# Patient Record
Sex: FEMALE | Race: WHITE | Employment: UNEMPLOYED | URBAN - METROPOLITAN AREA
[De-identification: names, ages, dates, MRNs, and addresses within clinical notes are randomized per-mention and may not be internally consistent; named-entity substitution may affect disease eponyms.]

---
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
## 2020-10-18 ENCOUNTER — HOSPITAL ENCOUNTER (EMERGENCY)
Age: 20
Discharge: HOME | End: 2020-10-18
Attending: EMERGENCY MEDICINE
Payer: MEDICAID

## 2020-10-18 ENCOUNTER — APPOINTMENT (OUTPATIENT)
Dept: CT IMAGING | Age: 20
End: 2020-10-18
Payer: MEDICAID

## 2020-10-18 ENCOUNTER — APPOINTMENT (OUTPATIENT)
Dept: ULTRASOUND IMAGING | Age: 20
End: 2020-10-18
Payer: MEDICAID

## 2020-10-18 VITALS
OXYGEN SATURATION: 100 % | DIASTOLIC BLOOD PRESSURE: 73 MMHG | HEIGHT: 62 IN | BODY MASS INDEX: 51.53 KG/M2 | RESPIRATION RATE: 16 BRPM | SYSTOLIC BLOOD PRESSURE: 111 MMHG | HEART RATE: 91 BPM | WEIGHT: 280 LBS | TEMPERATURE: 98.6 F

## 2020-10-18 LAB
A/G RATIO: 1.3 (ref 1.1–2.2)
ALBUMIN SERPL-MCNC: 4.3 G/DL (ref 3.4–5)
ALP BLD-CCNC: 65 U/L (ref 40–129)
ALT SERPL-CCNC: 12 U/L (ref 10–40)
ANION GAP SERPL CALCULATED.3IONS-SCNC: 10 MMOL/L (ref 3–16)
AST SERPL-CCNC: 11 U/L (ref 15–37)
BACTERIA: ABNORMAL /HPF
BASOPHILS ABSOLUTE: 0.1 K/UL (ref 0–0.2)
BASOPHILS RELATIVE PERCENT: 0.8 %
BILIRUB SERPL-MCNC: 0.4 MG/DL (ref 0–1)
BILIRUBIN URINE: NEGATIVE
BLOOD, URINE: ABNORMAL
BUN BLDV-MCNC: 13 MG/DL (ref 7–20)
CALCIUM SERPL-MCNC: 9.3 MG/DL (ref 8.3–10.6)
CHLORIDE BLD-SCNC: 99 MMOL/L (ref 99–110)
CLARITY: ABNORMAL
CO2: 26 MMOL/L (ref 21–32)
COLOR: YELLOW
CREAT SERPL-MCNC: <0.5 MG/DL (ref 0.6–1.1)
EOSINOPHILS ABSOLUTE: 0.2 K/UL (ref 0–0.6)
EOSINOPHILS RELATIVE PERCENT: 1.4 %
EPITHELIAL CELLS, UA: ABNORMAL /HPF (ref 0–5)
GFR AFRICAN AMERICAN: >60
GFR NON-AFRICAN AMERICAN: >60
GLOBULIN: 3.2 G/DL
GLUCOSE BLD-MCNC: 93 MG/DL (ref 70–99)
GLUCOSE URINE: NEGATIVE MG/DL
HCG QUALITATIVE: NEGATIVE
HCT VFR BLD CALC: 40.6 % (ref 36–48)
HEMOGLOBIN: 13.5 G/DL (ref 12–16)
KETONES, URINE: NEGATIVE MG/DL
LEUKOCYTE ESTERASE, URINE: ABNORMAL
LIPASE: 27 U/L (ref 13–60)
LYMPHOCYTES ABSOLUTE: 2.5 K/UL (ref 1–5.1)
LYMPHOCYTES RELATIVE PERCENT: 17 %
MCH RBC QN AUTO: 25.8 PG (ref 26–34)
MCHC RBC AUTO-ENTMCNC: 33.2 G/DL (ref 31–36)
MCV RBC AUTO: 77.5 FL (ref 80–100)
MICROSCOPIC EXAMINATION: YES
MONOCYTES ABSOLUTE: 0.6 K/UL (ref 0–1.3)
MONOCYTES RELATIVE PERCENT: 4.1 %
MUCUS: ABNORMAL /LPF
NEUTROPHILS ABSOLUTE: 11.5 K/UL (ref 1.7–7.7)
NEUTROPHILS RELATIVE PERCENT: 76.7 %
NITRITE, URINE: NEGATIVE
PDW BLD-RTO: 14.6 % (ref 12.4–15.4)
PH UA: 6.5 (ref 5–8)
PLATELET # BLD: 414 K/UL (ref 135–450)
PMV BLD AUTO: 7.6 FL (ref 5–10.5)
POTASSIUM REFLEX MAGNESIUM: 4.2 MMOL/L (ref 3.5–5.1)
PROTEIN UA: ABNORMAL MG/DL
RBC # BLD: 5.24 M/UL (ref 4–5.2)
RBC UA: ABNORMAL /HPF (ref 0–4)
S PYO AG THROAT QL: NEGATIVE
SODIUM BLD-SCNC: 135 MMOL/L (ref 136–145)
SPECIFIC GRAVITY UA: 1.02 (ref 1–1.03)
TOTAL PROTEIN: 7.5 G/DL (ref 6.4–8.2)
URINE REFLEX TO CULTURE: YES
URINE TYPE: ABNORMAL
UROBILINOGEN, URINE: 0.2 E.U./DL
WBC # BLD: 15 K/UL (ref 4–11)
WBC UA: ABNORMAL /HPF (ref 0–5)

## 2020-10-18 PROCEDURE — 74177 CT ABD & PELVIS W/CONTRAST: CPT

## 2020-10-18 PROCEDURE — 96374 THER/PROPH/DIAG INJ IV PUSH: CPT

## 2020-10-18 PROCEDURE — 87591 N.GONORRHOEAE DNA AMP PROB: CPT

## 2020-10-18 PROCEDURE — 87086 URINE CULTURE/COLONY COUNT: CPT

## 2020-10-18 PROCEDURE — 6360000004 HC RX CONTRAST MEDICATION: Performed by: PHYSICIAN ASSISTANT

## 2020-10-18 PROCEDURE — 93975 VASCULAR STUDY: CPT

## 2020-10-18 PROCEDURE — 6360000002 HC RX W HCPCS: Performed by: PHYSICIAN ASSISTANT

## 2020-10-18 PROCEDURE — 99284 EMERGENCY DEPT VISIT MOD MDM: CPT

## 2020-10-18 PROCEDURE — 6370000000 HC RX 637 (ALT 250 FOR IP): Performed by: EMERGENCY MEDICINE

## 2020-10-18 PROCEDURE — 87081 CULTURE SCREEN ONLY: CPT

## 2020-10-18 PROCEDURE — 87880 STREP A ASSAY W/OPTIC: CPT

## 2020-10-18 PROCEDURE — 81001 URINALYSIS AUTO W/SCOPE: CPT

## 2020-10-18 PROCEDURE — 2580000003 HC RX 258: Performed by: PHYSICIAN ASSISTANT

## 2020-10-18 PROCEDURE — 85025 COMPLETE CBC W/AUTO DIFF WBC: CPT

## 2020-10-18 PROCEDURE — 76830 TRANSVAGINAL US NON-OB: CPT

## 2020-10-18 PROCEDURE — 80053 COMPREHEN METABOLIC PANEL: CPT

## 2020-10-18 PROCEDURE — 84703 CHORIONIC GONADOTROPIN ASSAY: CPT

## 2020-10-18 PROCEDURE — 83690 ASSAY OF LIPASE: CPT

## 2020-10-18 PROCEDURE — 6370000000 HC RX 637 (ALT 250 FOR IP): Performed by: PHYSICIAN ASSISTANT

## 2020-10-18 PROCEDURE — 87491 CHLMYD TRACH DNA AMP PROBE: CPT

## 2020-10-18 RX ORDER — CEFDINIR 300 MG/1
300 CAPSULE ORAL EVERY 12 HOURS SCHEDULED
Status: DISCONTINUED | OUTPATIENT
Start: 2020-10-18 | End: 2020-10-18

## 2020-10-18 RX ORDER — CEFDINIR 300 MG/1
300 CAPSULE ORAL ONCE
Status: COMPLETED | OUTPATIENT
Start: 2020-10-18 | End: 2020-10-18

## 2020-10-18 RX ORDER — KETOROLAC TROMETHAMINE 30 MG/ML
15 INJECTION, SOLUTION INTRAMUSCULAR; INTRAVENOUS ONCE
Status: COMPLETED | OUTPATIENT
Start: 2020-10-18 | End: 2020-10-18

## 2020-10-18 RX ORDER — PHENAZOPYRIDINE HYDROCHLORIDE 100 MG/1
200 TABLET, FILM COATED ORAL ONCE
Status: COMPLETED | OUTPATIENT
Start: 2020-10-18 | End: 2020-10-18

## 2020-10-18 RX ORDER — 0.9 % SODIUM CHLORIDE 0.9 %
1000 INTRAVENOUS SOLUTION INTRAVENOUS ONCE
Status: COMPLETED | OUTPATIENT
Start: 2020-10-18 | End: 2020-10-18

## 2020-10-18 RX ORDER — IBUPROFEN 600 MG/1
600 TABLET ORAL EVERY 6 HOURS PRN
Qty: 20 TABLET | Refills: 0 | Status: SHIPPED | OUTPATIENT
Start: 2020-10-18 | End: 2021-01-22

## 2020-10-18 RX ORDER — CEFDINIR 300 MG/1
300 CAPSULE ORAL 2 TIMES DAILY
Qty: 20 CAPSULE | Refills: 0 | Status: SHIPPED | OUTPATIENT
Start: 2020-10-18 | End: 2020-10-28

## 2020-10-18 RX ORDER — PHENAZOPYRIDINE HYDROCHLORIDE 100 MG/1
100 TABLET, FILM COATED ORAL 3 TIMES DAILY PRN
Qty: 6 TABLET | Refills: 0 | Status: SHIPPED | OUTPATIENT
Start: 2020-10-18 | End: 2020-10-20

## 2020-10-18 RX ADMIN — SODIUM CHLORIDE 1000 ML: 9 INJECTION, SOLUTION INTRAVENOUS at 15:30

## 2020-10-18 RX ADMIN — IOPAMIDOL 75 ML: 755 INJECTION, SOLUTION INTRAVENOUS at 15:40

## 2020-10-18 RX ADMIN — PHENAZOPYRIDINE 200 MG: 100 TABLET ORAL at 19:03

## 2020-10-18 RX ADMIN — KETOROLAC TROMETHAMINE 15 MG: 30 INJECTION, SOLUTION INTRAMUSCULAR; INTRAVENOUS at 15:31

## 2020-10-18 RX ADMIN — CEFDINIR 300 MG: 300 CAPSULE ORAL at 19:03

## 2020-10-18 ASSESSMENT — PAIN DESCRIPTION - LOCATION
LOCATION: ABDOMEN
LOCATION: ABDOMEN

## 2020-10-18 ASSESSMENT — ENCOUNTER SYMPTOMS
CONSTIPATION: 0
ABDOMINAL PAIN: 1
COUGH: 0
SORE THROAT: 1
NAUSEA: 0
SHORTNESS OF BREATH: 0
VOMITING: 0
DIARRHEA: 0

## 2020-10-18 ASSESSMENT — PAIN SCALES - GENERAL
PAINLEVEL_OUTOF10: 10
PAINLEVEL_OUTOF10: 9
PAINLEVEL_OUTOF10: 8

## 2020-10-18 ASSESSMENT — PAIN DESCRIPTION - PAIN TYPE: TYPE: ACUTE PAIN

## 2020-10-18 ASSESSMENT — PAIN DESCRIPTION - ORIENTATION: ORIENTATION: LOWER

## 2020-10-18 NOTE — ED NOTES
Pt refusing pelvic exam at this time. Ondina OQUENDO and Dr. Meena Shaffer made aware at this time.       Gerard Turk RN  10/18/20 7900

## 2020-10-18 NOTE — ED PROVIDER NOTES
[address] ED  EMERGENCY DEPARTMENT ENCOUNTER        Pt Name: Farrel Saint  MRN: [id number]  [place] 2000  Date of evaluation: 10/18/2020  Provider: Malik Granados PA-C  PCP: No primary care provider on file. Shared Visit or Autonomous Visit:  I have seen and evaluated this patient with my supervising physician Leonora, [address] [place] [address]       Chief Complaint   Patient presents with    Abdominal Pain     Pt c/o low abd pain that began overnt. States she thought at first it was menstrual cramps but have continued to intensify. Negative hcg at home. Denies N/V/D.       HISTORY OF PRESENT ILLNESS   (Location/Symptom, Timing/Onset, Context/Setting, Quality, Duration, Modifying Factors, Severity)  Note limiting factors. Farrel Saint is a 23 y.o. female presented to the emergency department complaint of lower abdominal pain that started last night when she laid down for bed. Pain has been constant and worsening. Worse with position change and movement. States if she lays on one side will cause more pain and then she has to switch positions and then causes pain on the other side pain is across lower abdomen. Denies any nausea no vomiting no diarrhea no constipation. No fevers. No urinary symptoms. Pain feels like period cramps but states she stopped her period 3 days ago. Denies any current vaginal bleeding and no discharge. No prior abdominal surgeries. Patient stating has also had a sore throat and has noticed redness at her uvula in the past 3 days states yesterday he had white spots on it but it is gone today. Hx uvulitis. No difficulty swallowing. No known ill exposures. No cough or fever. The history is provided by the patient.    Abdominal Pain   Pain location:  LLQ, RLQ and suprapubic  Pain quality: cramping    Pain radiates to:  Does not radiate  Onset quality:  Gradual  Duration:  2 days  Timing:  Constant  Progression: Worsening  Chronicity:  New  Worsened by: Movement and position changes  Associated symptoms: sore throat    Associated symptoms: no chest pain, no chills, no constipation, no cough, no diarrhea, no dysuria, no fever, no hematuria, no nausea, no shortness of breath, no vaginal bleeding, no vaginal discharge and no vomiting          Nursing Notes were reviewed    REVIEW OF SYSTEMS    (2-9 systems for level 4, 10 or more for level 5)     Review of Systems   Constitutional: Negative for chills and fever. HENT: Positive for sore throat. Respiratory: Negative for cough and shortness of breath. Cardiovascular: Negative for chest pain. Gastrointestinal: Positive for abdominal pain. Negative for constipation, diarrhea, nausea and vomiting. Genitourinary: Negative for difficulty urinating, dysuria, hematuria, vaginal bleeding and vaginal discharge. Skin: Negative for rash. All other systems reviewed and are negative. Positives and Pertinent negatives as per HPI. PAST MEDICAL HISTORY     Past Medical History:   Diagnosis Date    Attention deficit disorder with hyperactivity(314.01)     JEANNE (generalized anxiety disorder)     GERD (gastroesophageal reflux disease)     Hypothyroid     PTSD (post-traumatic stress disorder)     Severe recurrent major depression with psychotic features ([facility] [address]          SURGICAL HISTORY   History reviewed. No pertinent surgical history. CURRENTMEDICATIONS       Previous Medications    TERBINAFINE (ATHLETES FOOT) 1 % CREAM    Apply topically 2 times daily. ALLERGIES     Augmentin [amoxicillin-pot clavulanate]    FAMILYHISTORY     History reviewed. No pertinent family history.        SOCIAL HISTORY       Social History     Socioeconomic History    Marital status: Single     Spouse name: None    Number of children: None    Years of education: None    Highest education level: None   Occupational History    None   Social Needs    Financial resource Uvula midline. Posterior oropharyngeal erythema present. No oropharyngeal exudate. Comments: Erythema oropharynx and uvula. Mild uvular swelling. Uvula is midline. No exudate. No abscess. Normal voice. Eyes:      Conjunctiva/sclera: Conjunctivae normal.      Pupils: Pupils are equal, round, and reactive to light. Neck:      Musculoskeletal: Normal range of motion and neck supple. Vascular: No JVD. Cardiovascular:      Rate and Rhythm: Normal rate and regular rhythm. Pulses: Normal pulses. Heart sounds: Normal heart sounds. Pulmonary:      Effort: Pulmonary effort is normal. No respiratory distress. Breath sounds: Normal breath sounds. No stridor. No wheezing, rhonchi or rales. Abdominal:      General: Bowel sounds are normal. There is no distension. Palpations: Abdomen is soft. Abdomen is not rigid. There is no mass. Tenderness: There is no abdominal tenderness. There is no guarding or rebound. Musculoskeletal: Normal range of motion. Skin:     General: Skin is warm and dry. Findings: No rash. Neurological:      Mental Status: She is alert and oriented to person, place, and time. GCS: GCS eye subscore is 4. GCS verbal subscore is 5. GCS motor subscore is 6. Cranial Nerves: No cranial nerve deficit. Sensory: No sensory deficit. Motor: No abnormal muscle tone.       Coordination: Coordination normal.   Psychiatric:         Behavior: Behavior normal.         DIAGNOSTIC RESULTS   LABS:    Labs Reviewed   URINE RT REFLEX TO CULTURE - Abnormal; Notable for the following components:       Result Value    Clarity, UA SL CLOUDY (*)     Blood, Urine LARGE (*)     Protein, UA TRACE (*)     Leukocyte Esterase, Urine LARGE (*)     All other components within normal limits    Narrative:     Performed at:  [place] [address],  [place], [facility]   Phone [phone]   CBC WITH AUTO DIFFERENTIAL - Abnormal; Notable for the following components:    WBC 15.0 (*)     RBC 5.24 (*)     MCV 77.5 (*)     MCH 25.8 (*)     Neutrophils Absolute 11.5 (*)     All other components within normal limits    Narrative:     Performed at:  [facility],  [place] [employer]   Phone [phone]   COMPREHENSIVE METABOLIC PANEL W/ REFLEX TO MG FOR LOW K - Abnormal; Notable for the following components:    Sodium 135 (*)     CREATININE <0.5 (*)     AST 11 (*)     All other components within normal limits    Narrative:     Performed at:  [place] [address],  [place]   Phone [phone]   MICROSCOPIC URINALYSIS - Abnormal; Notable for the following components:    Mucus, UA 2+ (*)     WBC, UA  (*)     RBC, UA 11-20 (*)     Epithelial Cells, UA 6-10 (*)     Bacteria, UA 2+ (*)     All other components within normal limits    Narrative:     Performed at:  [facility] [employer],  [place]   Phone [phone] THROAT    Narrative:     Performed at:  [place] [address],  [email or web address]   Phone [phone]   CULTURE, URINE   CULTURE, BETA STREP CONFIRM PLATES   C.TRACHOMATIS N.GONORRHOEAE DNA   WET PREP, GENITAL   C.TRACHOMATIS N.GONORRHOEAE DNA, URINE   LIPASE    Narrative:     Performed at:  [facility],  [place] [employer]   Phone [phone]   HCG, SERUM, QUALITATIVE    Narrative:     Performed at:  [facility],  [place] [employer] [email or web address]   Phone [phone]     Results for orders placed or performed during the hospital encounter of 10/18/20   Strep screen group a throat    Specimen: Throat   Result Value Ref Range    Rapid Strep A Screen Negative Negative   Urinalysis Reflex to Culture    Specimen: Urine, clean catch   Result Value Ref Range    Color, UA Yellow Straw/Yellow    Clarity, UA SL CLOUDY (A) Clear    Glucose, Ur Negative Negative mg/dL    Bilirubin Urine Negative Negative    Ketones, Urine Negative Negative mg/dL    Specific Gravity, UA 1.020 1.005 - 1.030    Blood, Urine LARGE (A) Negative    pH, UA 6.5 5.0 - 8.0    Protein, UA TRACE (A) Negative mg/dL    Urobilinogen, Urine 0.2 <2.0 E.U./dL    Nitrite, Urine Negative Negative    Leukocyte Esterase, Urine LARGE (A) Negative    Microscopic Examination YES     Urine Type NotGiven     Urine Reflex to Culture Yes    CBC Auto Differential   Result Value Ref Range    WBC 15.0 (H) 4.0 - 11.0 K/uL    RBC 5.24 (H) 4.00 - 5.20 M/uL    Hemoglobin 13.5 12.0 - 16.0 g/dL    Hematocrit 40.6 36.0 - 48.0 %    MCV 77.5 (L) 80.0 - 100.0 fL    MCH 25.8 (L) 26.0 - 34.0 pg    MCHC 33.2 31.0 - 36.0 g/dL    RDW 14.6 12.4 - 15.4 %    Platelets 548 168 - 061 K/uL    MPV 7.6 5.0 - 10.5 fL    Neutrophils % 76.7 %    Lymphocytes % 17.0 %    Monocytes % 4.1 %    Eosinophils % 1.4 %    Basophils % 0.8 %    Neutrophils Absolute 11.5 (H) 1.7 - 7.7 K/uL    Lymphocytes Absolute 2.5 1.0 - 5.1 K/uL    Monocytes Absolute 0.6 0.0 - 1.3 K/uL    Eosinophils Absolute 0.2 0.0 - 0.6 K/uL    Basophils Absolute 0.1 0.0 - 0.2 K/uL   Comprehensive Metabolic Panel w/ Reflex to MG   Result Value Ref Range    Sodium 135 (L) 136 - 145 mmol/L    Potassium reflex Magnesium 4.2 3.5 - 5.1 mmol/L    Chloride 99 99 - 110 mmol/L    CO2 26 21 - 32 mmol/L    Anion Gap 10 3 - 16    Glucose 93 70 - 99 mg/dL    BUN 13 7 - 20 mg/dL    CREATININE <0.5 (L) 0.6 - 1.1 mg/dL    GFR Non-African American >60 >60    GFR African American >60 >60    Calcium 9.3 8.3 - 10.6 mg/dL    Total Protein 7.5 6.4 - 8.2 g/dL    Alb 4.3 3.4 - 5.0 g/dL    Albumin/Globulin Ratio 1.3 1.1 - 2.2    Total Bilirubin 0.4 0.0 - 1.0 mg/dL    Alkaline Phosphatase 65 40 - 129 U/L    ALT 12 10 - 40 U/L    AST 11 (L) 15 - 37 U/L    Globulin 3.2 g/dL   Lipase   Result Value Ref Range    Lipase 27.0 13.0 - 60.0 U/L   HCG Qualitative, Serum   Result Value Ref Range    hCG Qual Negative Detects HCG level >10 MIU/mL   Microscopic Urinalysis   Result Value Ref Range    Mucus, UA 2+ (A) None Seen /LPF    WBC, UA  (A) 0 - 5 /HPF    RBC, UA 11-20 (A) 0 - 4 /HPF    Epithelial Cells, UA 6-10 (A) 0 - 5 /HPF    Bacteria, UA 2+ (A) None Seen /HPF       All other labs were within normal range or not returned as of this dictation. EKG: All EKG's are interpreted by the Emergency Department Physician in the absence of a cardiologist.  Please see their note for interpretation of EKG. RADIOLOGY:   Non-plain film images such as CT, Ultrasound and MRI are read by the radiologist. Rod Handing radiographic images are visualized andpreliminarily interpreted by the  ED Provider with the below findings:        Interpretation perthe Radiologist below, if available at the time of this note:    US NON OB TRANSVAGINAL   Final Result   Unremarkable pelvic ultrasound. Normal blood flow seen to both ovaries         CT ABDOMEN PELVIS W IV CONTRAST Additional Contrast? None   Final Result   Dense material in the bilateral renal pelvis, suggestive of nonobstructing   subcentimeter renal stones or less likely excreted IV contrast.      Minimal fat stranding anterior to the left ovary. This is nonspecific. Pelvic ultrasound may be obtained if clinical suspicion for ovarian torsion   is high. Normal appendix. US DUP ABD PEL RETRO SCROT COMPLETE    (Results Pending)     Us Non Ob Transvaginal    Result Date: 10/18/2020  EXAMINATION: PELVIC ULTRASOUND 10/18/2020 TECHNIQUE: Transvaginal pelvic ultrasound was performed. Color Doppler evaluation was performed. COMPARISON: None HISTORY: ORDERING SYSTEM PROVIDED HISTORY: Include arterial and venous dopplers of ovaries, r/o torsion. Pelvic pain.  TECHNOLOGIST PROVIDED HISTORY: Reason for exam:->Include arterial and venous dopplers of ovaries, r/o torsion. Pelvic pain. FINDINGS: Measurements: Uterus:  5.8 x 3.6 x 5.2 cm Endometrial stripe:  4 mm Right Ovary:  3.2 x 2 x 3.5 cm Left Ovary:  2.7 x 2.1 x 2.6 cm Ultrasound Findings: Uterus: Uterus demonstrates normal myometrial echotexture. Endometrial stripe: Endometrial stripe is within normal limits. Right Ovary: Right ovary is within normal limits. 16 mm follicular cyst. There is normal arterial and venous doppler flow. Left Ovary:  Left ovary is within normal limits. There is normal arterial and venous doppler flow. Free Fluid: No evidence of free fluid. Unremarkable pelvic ultrasound. Normal blood flow seen to both ovaries     Ct Abdomen Pelvis W Iv Contrast Additional Contrast? None    Result Date: 10/18/2020  EXAMINATION: CT OF THE ABDOMEN AND PELVIS WITH CONTRAST 10/18/2020 3:41 pm TECHNIQUE: CT of the abdomen and pelvis was performed with the administration of intravenous contrast. Multiplanar reformatted images are provided for review. Dose modulation, iterative reconstruction, and/or weight based adjustment of the mA/kV was utilized to reduce the radiation dose to as low as reasonably achievable. COMPARISON: None. HISTORY: ORDERING SYSTEM PROVIDED HISTORY: lower abdominal pain TECHNOLOGIST PROVIDED HISTORY: Reason for exam:->lower abdominal pain Additional Contrast?->None Reason for Exam: Abdominal Pain (Pt c/o low abd pain that began overnt. States she thought at first it was menstrual cramps but have continued to intensify. Negative hcg at home. Denies N/V/D.) FINDINGS: Lower Chest: Minimal bibasilar dependent atelectasis. Organs: Unremarkable liver, spleen, pancreas, and adrenals. Multifocal areas of dense material in the bilateral renal pelves, suggestive of non-obstructing renal stones measuring up to 0.5 cm. 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 Normal appendix.   Large amount of retained stool in the rectum with no evidence of bowel obstruction. Pelvis: Minimal fat stranding anterior to the left ovary. No adnexal mass. Incompletely distended but grossly unremarkable urinary bladder. Peritoneum/Retroperitoneum: No free air or free fluid. No abdominal or pelvic adenopathy. Shotty slightly prominent bilateral inguinal lymph nodes, nonspecific and likely reactive. Bones/Soft Tissues: Intact osseous structures. Dense material in the bilateral renal pelvis, suggestive of nonobstructing subcentimeter renal stones or less likely excreted IV contrast. Minimal fat stranding anterior to the left ovary. This is nonspecific. Pelvic ultrasound may be obtained if clinical suspicion for ovarian torsion is high. Normal appendix. PROCEDURES   Unless otherwise noted below, none     Procedures    CRITICAL CARE TIME   N/A    CONSULTS:  None      EMERGENCY DEPARTMENT COURSE and DIFFERENTIAL DIAGNOSIS/MDM:   Vitals:    Vitals:    10/18/20 1535 10/18/20 1736 10/18/20 1745 10/18/20 1835   BP: 130/83 (!) 143/72 122/69 92/69   Pulse: 93  87 88   Resp: 16  16 16   Temp:       TempSrc:       SpO2: 98% 99% 100% 100%   Weight:       Height:           Patient was given thefollowing medications:  Medications   cefdinir (OMNICEF) capsule 300 mg (has no administration in time range)   phenazopyridine (PYRIDIUM) tablet 200 mg (has no administration in time range)   ketorolac (TORADOL) injection 15 mg (15 mg Intravenous Given 10/18/20 1531)   0.9 % sodium chloride bolus (0 mLs Intravenous Stopped 10/18/20 1835)   iopamidol (ISOVUE-370) 76 % injection 75 mL (75 mLs Intravenous Given 10/18/20 1540)       4:41 PM EDT  Recheck pt. Stable. Discussed results with her. Negative pregnancy test. Urinalysis showing signs of UTI will treat with abx. Wbc 15. CT showing normal appendix. Stranding around left ovary. Recommend further evaluation with pelvic exam and ultrasound.  Patient is in agreement with work up.     6:21 PM EDT  [employer] patient. Discussed pelvic ultrasound results unremarkable. No signs of torsion. I recommend pelvic exam here but patient is refusing. She has signs of urinary tract infection urinalysis  WBC will be started on antibiotics. I asked her her allergy listed to Augmentin states caused nausea and diarrhea no shortness of breath or anaphylaxis. She is in agreement with starting AMADOR GAGE given here along with Pyridium. She also has mild uvulitis strep is negative. If there is bacterial cause Omnicef should cover this as well. No airway compromise. We discussed close follow-up with her doctor and to return to the ER for any worsening or changes. She understands and agrees. I estimate there is LOW risk for ACUTE APPENDICITIS, BOWEL OBSTRUCTION, CHOLECYSTITIS, DIVERTICULITIS, INCARCERATED HERNIA, PANCREATITIS, PERFORATED BOWEL, BOWEL ISCHEMIA, GONADAL TORSION, OR CARDIAC ISCHEMIA, thus I consider the discharge disposition reasonable. Also, there is no evidence or peritonitis, sepsis, or toxicity. I estimate there is LOW risk for PNEUMONIA, MENINGITIS, PERITONSILLAR ABSCESS, SEPSIS, MALIGNANT OTITIS EXTERNA, OR EPIGLOTTITIS thus I consider the discharge disposition reasonable. FINAL IMPRESSION      1. Lower abdominal pain    2. Acute cystitis with hematuria    3. Renal calculus, bilateral    4.  Uvulitis          DISPOSITION/PLAN   DISPOSITION     PATIENT REFERREDTO:  [facility]) Pre-Services  [phone]  Schedule an appointment as soon as possible for a visit   primary care referral    [place] [facility] [place] [facility] [place] [facility]  [address] [facility]  [phone]    If symptoms worsen      DISCHARGE MEDICATIONS:  New Prescriptions    CEFDINIR (OMNICEF) 300 MG CAPSULE    Take 1 capsule by mouth 2 times daily for 10 days    IBUPROFEN (IBU) 600 MG TABLET    Take 1 tablet by mouth every 6 hours as needed for Pain    PHENAZOPYRIDINE (PYRIDIUM) 100 MG TABLET    Take 1 tablet by mouth 3 times daily as needed for Pain       DISCONTINUED MEDICATIONS:  Discontinued Medications    No medications on file              (Please note that portions ofthis note were completed with a voice recognition program.  Efforts were made to edit the dictations but occasionally words are mis-transcribed.)    Mayuri Lopez PA-C (electronically signed)            Yamileth Vargas PA-C  10/18/20 9638

## 2020-10-19 LAB
C. TRACHOMATIS DNA ,URINE: NEGATIVE
N. GONORRHOEAE DNA, URINE: POSITIVE
URINE CULTURE, ROUTINE: NORMAL

## 2020-10-20 LAB — S PYO THROAT QL CULT: NORMAL

## 2020-10-20 NOTE — ED PROVIDER NOTES
I independently interviewed, examined and evaluated Waldo Arceo. In brief, this is a 71-year-old female who presents with lower abdominal cramping and pain. She describes it most notably in her left lower quadrant. She states it feels similar to a prior \"miscarriage\", as well as a prior UTI. She denies current vaginal bleeding or discharge. She denies concern for sexually transmitted infection. She denies fever, chills, vomiting, or diarrhea. On exam she is nontoxic-appearing. She has mild erythema to her uvula without soft tissue swelling, there is no tongue, lip, or soft palate swelling. No stridor or hoarseness. She does have tenderness to her lower abdomen, McBurney's point is nontender, and abdomen is nonperitoneal.  Distal pulses are intact. There is no CVA tenderness. ED course: Patient presents with lower abdominal pain. Her vital signs are within normal limits. She is afebrile. She does have tenderness to palpation in her lower abdomen. Work-up is performed. CT is negative for an acute surgical process, however there is stranding noted to be around the left ovary. Therefore transvaginal ultrasound was obtained, there is no sign of torsion. The patient does have pyuria, without nitrites. She will be treated with Omnicef as she also is noted to have a mild uvulitis, this will cover the doctors. Pelvic exam was going to be performed, however the patient refused this. The PA and nurse spoke with the patient regarding the need for this, but she still refused. I also discussed the need for pelvic exam with the patient as she does have pyuria without obvious UTI, indicating that she could have bacteria in her urine from a sexually transmitted infection. However the patient continues to refuse the pelvic. We did send urine gonorrhea and Chlamydia cultures. She states she does not want empiric treatment for sexually transmitted infection.   Otherwise the patient is given symptomatic and care instructions for home, also told to follow-up with primary care and given a referral.  She is given strict return precautions and voices understanding. All diagnostic, treatment, and disposition decisions were made by myself in conjunction with the advanced practice provider. For all further details of the patient's emergency department visit, please see the advanced practice provider's documentation. Comment: Please note this report has been produced using speech recognition software and may contain errors related to that system including errors in grammar, punctuation, and spelling, as well as words and phrases that may be inappropriate. If there are any questions or concerns please feel free to contact the dictating provider for clarification.          Leonora, [address] [place] [address]  10/19/20 9312

## 2020-11-16 ENCOUNTER — APPOINTMENT (OUTPATIENT)
Dept: CT IMAGING | Age: 20
End: 2020-11-16
Payer: MEDICAID

## 2020-11-16 ENCOUNTER — APPOINTMENT (OUTPATIENT)
Dept: GENERAL RADIOLOGY | Age: 20
End: 2020-11-16
Payer: MEDICAID

## 2020-11-16 ENCOUNTER — APPOINTMENT (OUTPATIENT)
Dept: ULTRASOUND IMAGING | Age: 20
End: 2020-11-16
Payer: MEDICAID

## 2020-11-16 ENCOUNTER — HOSPITAL ENCOUNTER (EMERGENCY)
Age: 20
Discharge: ANOTHER HOSPITAL | End: 2020-11-17
Attending: EMERGENCY MEDICINE
Payer: MEDICAID

## 2020-11-16 LAB
A/G RATIO: 1.2 (ref 1.1–2.2)
ALBUMIN SERPL-MCNC: 4.4 G/DL (ref 3.4–5)
ALP BLD-CCNC: 68 U/L (ref 40–129)
ALT SERPL-CCNC: 8 U/L (ref 10–40)
AMORPHOUS: ABNORMAL /HPF
ANION GAP SERPL CALCULATED.3IONS-SCNC: 11 MMOL/L (ref 3–16)
AST SERPL-CCNC: 10 U/L (ref 15–37)
BACTERIA WET PREP: NORMAL
BASOPHILS ABSOLUTE: 0.1 K/UL (ref 0–0.2)
BASOPHILS RELATIVE PERCENT: 0.3 %
BILIRUB SERPL-MCNC: 0.4 MG/DL (ref 0–1)
BILIRUBIN URINE: NEGATIVE
BLOOD, URINE: ABNORMAL
BUN BLDV-MCNC: 14 MG/DL (ref 7–20)
CALCIUM SERPL-MCNC: 9.4 MG/DL (ref 8.3–10.6)
CHLORIDE BLD-SCNC: 100 MMOL/L (ref 99–110)
CLARITY: ABNORMAL
CLUE CELLS: NORMAL
CO2: 23 MMOL/L (ref 21–32)
COLOR: YELLOW
CREAT SERPL-MCNC: <0.5 MG/DL (ref 0.6–1.1)
EOSINOPHILS ABSOLUTE: 0 K/UL (ref 0–0.6)
EOSINOPHILS RELATIVE PERCENT: 0.1 %
EPITHELIAL CELLS WET PREP: NORMAL
EPITHELIAL CELLS, UA: ABNORMAL /HPF (ref 0–5)
GFR AFRICAN AMERICAN: >60
GFR NON-AFRICAN AMERICAN: >60
GLOBULIN: 3.6 G/DL
GLUCOSE BLD-MCNC: 108 MG/DL (ref 70–99)
GLUCOSE URINE: NEGATIVE MG/DL
HCG QUALITATIVE: NEGATIVE
HCT VFR BLD CALC: 37.1 % (ref 36–48)
HEMOGLOBIN: 12 G/DL (ref 12–16)
KETONES, URINE: NEGATIVE MG/DL
LACTIC ACID: 0.9 MMOL/L (ref 0.4–2)
LEUKOCYTE ESTERASE, URINE: ABNORMAL
LIPASE: 12 U/L (ref 13–60)
LYMPHOCYTES ABSOLUTE: 1.7 K/UL (ref 1–5.1)
LYMPHOCYTES RELATIVE PERCENT: 9.5 %
MCH RBC QN AUTO: 25.4 PG (ref 26–34)
MCHC RBC AUTO-ENTMCNC: 32.3 G/DL (ref 31–36)
MCV RBC AUTO: 78.5 FL (ref 80–100)
MICROSCOPIC EXAMINATION: YES
MONOCYTES ABSOLUTE: 0.8 K/UL (ref 0–1.3)
MONOCYTES RELATIVE PERCENT: 4.3 %
NEUTROPHILS ABSOLUTE: 15.1 K/UL (ref 1.7–7.7)
NEUTROPHILS RELATIVE PERCENT: 85.8 %
NITRITE, URINE: NEGATIVE
PDW BLD-RTO: 15.1 % (ref 12.4–15.4)
PH UA: 6 (ref 5–8)
PLATELET # BLD: 333 K/UL (ref 135–450)
PMV BLD AUTO: 8.3 FL (ref 5–10.5)
POTASSIUM REFLEX MAGNESIUM: 3.8 MMOL/L (ref 3.5–5.1)
PROTEIN UA: 30 MG/DL
RBC # BLD: 4.73 M/UL (ref 4–5.2)
RBC UA: ABNORMAL /HPF (ref 0–4)
RBC WET PREP: NORMAL
SODIUM BLD-SCNC: 134 MMOL/L (ref 136–145)
SOURCE WET PREP: NORMAL
SPECIFIC GRAVITY UA: >=1.03 (ref 1–1.03)
TOTAL PROTEIN: 8 G/DL (ref 6.4–8.2)
TRICHOMONAS PREP: NORMAL
TROPONIN: <0.01 NG/ML
URINE TYPE: ABNORMAL
UROBILINOGEN, URINE: 0.2 E.U./DL
WBC # BLD: 17.6 K/UL (ref 4–11)
WBC UA: ABNORMAL /HPF (ref 0–5)
WBC WET PREP: NORMAL
YEAST WET PREP: NORMAL

## 2020-11-16 PROCEDURE — 76856 US EXAM PELVIC COMPLETE: CPT

## 2020-11-16 PROCEDURE — 2580000003 HC RX 258: Performed by: PHYSICIAN ASSISTANT

## 2020-11-16 PROCEDURE — 6360000002 HC RX W HCPCS: Performed by: PHYSICIAN ASSISTANT

## 2020-11-16 PROCEDURE — 87210 SMEAR WET MOUNT SALINE/INK: CPT

## 2020-11-16 PROCEDURE — 93005 ELECTROCARDIOGRAM TRACING: CPT | Performed by: PHYSICIAN ASSISTANT

## 2020-11-16 PROCEDURE — 83690 ASSAY OF LIPASE: CPT

## 2020-11-16 PROCEDURE — 93975 VASCULAR STUDY: CPT

## 2020-11-16 PROCEDURE — 87040 BLOOD CULTURE FOR BACTERIA: CPT

## 2020-11-16 PROCEDURE — 81001 URINALYSIS AUTO W/SCOPE: CPT

## 2020-11-16 PROCEDURE — 83605 ASSAY OF LACTIC ACID: CPT

## 2020-11-16 PROCEDURE — 6360000004 HC RX CONTRAST MEDICATION: Performed by: PHYSICIAN ASSISTANT

## 2020-11-16 PROCEDURE — 96365 THER/PROPH/DIAG IV INF INIT: CPT

## 2020-11-16 PROCEDURE — 87591 N.GONORRHOEAE DNA AMP PROB: CPT

## 2020-11-16 PROCEDURE — 87491 CHLMYD TRACH DNA AMP PROBE: CPT

## 2020-11-16 PROCEDURE — 87086 URINE CULTURE/COLONY COUNT: CPT

## 2020-11-16 PROCEDURE — 84484 ASSAY OF TROPONIN QUANT: CPT

## 2020-11-16 PROCEDURE — 71046 X-RAY EXAM CHEST 2 VIEWS: CPT

## 2020-11-16 PROCEDURE — 2500000003 HC RX 250 WO HCPCS: Performed by: PHYSICIAN ASSISTANT

## 2020-11-16 PROCEDURE — 84703 CHORIONIC GONADOTROPIN ASSAY: CPT

## 2020-11-16 PROCEDURE — 85025 COMPLETE CBC W/AUTO DIFF WBC: CPT

## 2020-11-16 PROCEDURE — 99285 EMERGENCY DEPT VISIT HI MDM: CPT

## 2020-11-16 PROCEDURE — 74177 CT ABD & PELVIS W/CONTRAST: CPT

## 2020-11-16 PROCEDURE — 80053 COMPREHEN METABOLIC PANEL: CPT

## 2020-11-16 PROCEDURE — 96375 TX/PRO/DX INJ NEW DRUG ADDON: CPT

## 2020-11-16 RX ORDER — ONDANSETRON 2 MG/ML
4 INJECTION INTRAMUSCULAR; INTRAVENOUS ONCE
Status: COMPLETED | OUTPATIENT
Start: 2020-11-16 | End: 2020-11-16

## 2020-11-16 RX ORDER — 0.9 % SODIUM CHLORIDE 0.9 %
1000 INTRAVENOUS SOLUTION INTRAVENOUS ONCE
Status: COMPLETED | OUTPATIENT
Start: 2020-11-16 | End: 2020-11-17

## 2020-11-16 RX ORDER — ACETAMINOPHEN 500 MG
1000 TABLET ORAL ONCE
Status: COMPLETED | OUTPATIENT
Start: 2020-11-16 | End: 2020-11-17

## 2020-11-16 RX ORDER — MORPHINE SULFATE 4 MG/ML
4 INJECTION, SOLUTION INTRAMUSCULAR; INTRAVENOUS EVERY 30 MIN PRN
Status: DISCONTINUED | OUTPATIENT
Start: 2020-11-16 | End: 2020-11-17 | Stop reason: HOSPADM

## 2020-11-16 RX ORDER — KETOROLAC TROMETHAMINE 30 MG/ML
15 INJECTION, SOLUTION INTRAMUSCULAR; INTRAVENOUS ONCE
Status: COMPLETED | OUTPATIENT
Start: 2020-11-16 | End: 2020-11-16

## 2020-11-16 RX ADMIN — KETOROLAC TROMETHAMINE 15 MG: 30 INJECTION, SOLUTION INTRAMUSCULAR at 22:01

## 2020-11-16 RX ADMIN — MORPHINE SULFATE 4 MG: 4 INJECTION INTRAVENOUS at 23:09

## 2020-11-16 RX ADMIN — ONDANSETRON HYDROCHLORIDE 4 MG: 2 INJECTION, SOLUTION INTRAMUSCULAR; INTRAVENOUS at 22:01

## 2020-11-16 RX ADMIN — DOXYCYCLINE 100 MG: 100 INJECTION, POWDER, LYOPHILIZED, FOR SOLUTION INTRAVENOUS at 23:09

## 2020-11-16 RX ADMIN — IOPAMIDOL 75 ML: 755 INJECTION, SOLUTION INTRAVENOUS at 22:27

## 2020-11-16 RX ADMIN — SODIUM CHLORIDE 1000 ML: 9 INJECTION, SOLUTION INTRAVENOUS at 22:02

## 2020-11-16 RX ADMIN — SODIUM CHLORIDE 1000 ML: 9 INJECTION, SOLUTION INTRAVENOUS at 23:10

## 2020-11-16 ASSESSMENT — PAIN DESCRIPTION - DESCRIPTORS: DESCRIPTORS: CONSTANT;CRAMPING

## 2020-11-16 ASSESSMENT — PAIN DESCRIPTION - ORIENTATION: ORIENTATION: LEFT

## 2020-11-16 ASSESSMENT — PAIN DESCRIPTION - LOCATION: LOCATION: ABDOMEN

## 2020-11-16 ASSESSMENT — PAIN SCALES - GENERAL
PAINLEVEL_OUTOF10: 9
PAINLEVEL_OUTOF10: 10
PAINLEVEL_OUTOF10: 3

## 2020-11-16 ASSESSMENT — PAIN DESCRIPTION - PAIN TYPE: TYPE: ACUTE PAIN

## 2020-11-17 ENCOUNTER — HOSPITAL ENCOUNTER (INPATIENT)
Age: 20
LOS: 1 days | Discharge: HOME | DRG: 720 | End: 2020-11-18
Attending: INTERNAL MEDICINE | Admitting: INTERNAL MEDICINE
Payer: MEDICAID

## 2020-11-17 VITALS
HEART RATE: 73 BPM | OXYGEN SATURATION: 100 % | TEMPERATURE: 100.4 F | BODY MASS INDEX: 49.08 KG/M2 | RESPIRATION RATE: 16 BRPM | SYSTOLIC BLOOD PRESSURE: 110 MMHG | DIASTOLIC BLOOD PRESSURE: 68 MMHG | HEIGHT: 63 IN | WEIGHT: 277 LBS

## 2020-11-17 PROBLEM — N73.9 PELVIC INFLAMMATORY DISEASE: Status: ACTIVE | Noted: 2020-11-17

## 2020-11-17 PROBLEM — A41.9 SEPSIS (HCC): Status: ACTIVE | Noted: 2020-11-17

## 2020-11-17 LAB
EKG ATRIAL RATE: 115 BPM
EKG DIAGNOSIS: NORMAL
EKG P AXIS: 30 DEGREES
EKG P-R INTERVAL: 140 MS
EKG Q-T INTERVAL: 310 MS
EKG QRS DURATION: 90 MS
EKG QTC CALCULATION (BAZETT): 428 MS
EKG R AXIS: -19 DEGREES
EKG T AXIS: 18 DEGREES
EKG VENTRICULAR RATE: 115 BPM
LACTIC ACID, SEPSIS: 0.5 MMOL/L (ref 0.4–1.9)
LACTIC ACID, SEPSIS: 0.6 MMOL/L (ref 0.4–1.9)
SARS-COV-2, NAAT: NOT DETECTED

## 2020-11-17 PROCEDURE — 6360000002 HC RX W HCPCS: Performed by: PHYSICIAN ASSISTANT

## 2020-11-17 PROCEDURE — 96376 TX/PRO/DX INJ SAME DRUG ADON: CPT

## 2020-11-17 PROCEDURE — 2580000003 HC RX 258: Performed by: PHYSICIAN ASSISTANT

## 2020-11-17 PROCEDURE — 96372 THER/PROPH/DIAG INJ SC/IM: CPT

## 2020-11-17 PROCEDURE — 96368 THER/DIAG CONCURRENT INF: CPT

## 2020-11-17 PROCEDURE — 96367 TX/PROPH/DG ADDL SEQ IV INF: CPT

## 2020-11-17 PROCEDURE — 2060000000 HC ICU INTERMEDIATE R&B

## 2020-11-17 PROCEDURE — 93010 ELECTROCARDIOGRAM REPORT: CPT | Performed by: INTERNAL MEDICINE

## 2020-11-17 PROCEDURE — U0002 COVID-19 LAB TEST NON-CDC: HCPCS

## 2020-11-17 PROCEDURE — G0379 DIRECT REFER HOSPITAL OBSERV: HCPCS

## 2020-11-17 PROCEDURE — 96366 THER/PROPH/DIAG IV INF ADDON: CPT

## 2020-11-17 PROCEDURE — 6370000000 HC RX 637 (ALT 250 FOR IP): Performed by: INTERNAL MEDICINE

## 2020-11-17 PROCEDURE — G0378 HOSPITAL OBSERVATION PER HR: HCPCS

## 2020-11-17 PROCEDURE — 6360000002 HC RX W HCPCS: Performed by: INTERNAL MEDICINE

## 2020-11-17 PROCEDURE — 6370000000 HC RX 637 (ALT 250 FOR IP): Performed by: PHYSICIAN ASSISTANT

## 2020-11-17 PROCEDURE — 96365 THER/PROPH/DIAG IV INF INIT: CPT

## 2020-11-17 PROCEDURE — 2580000003 HC RX 258: Performed by: INTERNAL MEDICINE

## 2020-11-17 PROCEDURE — 83605 ASSAY OF LACTIC ACID: CPT

## 2020-11-17 RX ORDER — ONDANSETRON 2 MG/ML
4 INJECTION INTRAMUSCULAR; INTRAVENOUS EVERY 6 HOURS PRN
Status: DISCONTINUED | OUTPATIENT
Start: 2020-11-17 | End: 2020-11-18 | Stop reason: HOSPADM

## 2020-11-17 RX ORDER — OXYCODONE HYDROCHLORIDE AND ACETAMINOPHEN 5; 325 MG/1; MG/1
1 TABLET ORAL EVERY 4 HOURS PRN
Status: DISCONTINUED | OUTPATIENT
Start: 2020-11-17 | End: 2020-11-18 | Stop reason: HOSPADM

## 2020-11-17 RX ORDER — LORAZEPAM 1 MG/1
1 TABLET ORAL EVERY 6 HOURS PRN
Status: DISCONTINUED | OUTPATIENT
Start: 2020-11-17 | End: 2020-11-18 | Stop reason: HOSPADM

## 2020-11-17 RX ORDER — SODIUM CHLORIDE 0.9 % (FLUSH) 0.9 %
10 SYRINGE (ML) INJECTION PRN
Status: DISCONTINUED | OUTPATIENT
Start: 2020-11-17 | End: 2020-11-18 | Stop reason: HOSPADM

## 2020-11-17 RX ORDER — PROMETHAZINE HYDROCHLORIDE 25 MG/1
12.5 TABLET ORAL EVERY 6 HOURS PRN
Status: DISCONTINUED | OUTPATIENT
Start: 2020-11-17 | End: 2020-11-18 | Stop reason: HOSPADM

## 2020-11-17 RX ORDER — ACETAMINOPHEN 325 MG/1
650 TABLET ORAL EVERY 6 HOURS PRN
Status: DISCONTINUED | OUTPATIENT
Start: 2020-11-17 | End: 2020-11-18 | Stop reason: HOSPADM

## 2020-11-17 RX ORDER — SODIUM CHLORIDE 0.9 % (FLUSH) 0.9 %
10 SYRINGE (ML) INJECTION EVERY 12 HOURS SCHEDULED
Status: DISCONTINUED | OUTPATIENT
Start: 2020-11-17 | End: 2020-11-18 | Stop reason: HOSPADM

## 2020-11-17 RX ORDER — POLYETHYLENE GLYCOL 3350 17 G/17G
17 POWDER, FOR SOLUTION ORAL DAILY PRN
Status: DISCONTINUED | OUTPATIENT
Start: 2020-11-17 | End: 2020-11-18 | Stop reason: HOSPADM

## 2020-11-17 RX ORDER — SODIUM CHLORIDE 9 MG/ML
INJECTION, SOLUTION INTRAVENOUS CONTINUOUS
Status: ACTIVE | OUTPATIENT
Start: 2020-11-17 | End: 2020-11-18

## 2020-11-17 RX ORDER — HYDROMORPHONE HCL 110MG/55ML
0.5 PATIENT CONTROLLED ANALGESIA SYRINGE INTRAVENOUS EVERY 4 HOURS PRN
Status: DISCONTINUED | OUTPATIENT
Start: 2020-11-17 | End: 2020-11-18 | Stop reason: HOSPADM

## 2020-11-17 RX ORDER — OXYCODONE HYDROCHLORIDE AND ACETAMINOPHEN 5; 325 MG/1; MG/1
2 TABLET ORAL EVERY 4 HOURS PRN
Status: DISCONTINUED | OUTPATIENT
Start: 2020-11-17 | End: 2020-11-18 | Stop reason: HOSPADM

## 2020-11-17 RX ORDER — HYDROMORPHONE HCL 110MG/55ML
1 PATIENT CONTROLLED ANALGESIA SYRINGE INTRAVENOUS EVERY 4 HOURS PRN
Status: DISCONTINUED | OUTPATIENT
Start: 2020-11-17 | End: 2020-11-18 | Stop reason: HOSPADM

## 2020-11-17 RX ORDER — ACETAMINOPHEN 650 MG/1
650 SUPPOSITORY RECTAL EVERY 6 HOURS PRN
Status: DISCONTINUED | OUTPATIENT
Start: 2020-11-17 | End: 2020-11-18 | Stop reason: HOSPADM

## 2020-11-17 RX ADMIN — CEFTRIAXONE SODIUM 1 G: 1 INJECTION, POWDER, FOR SOLUTION INTRAMUSCULAR; INTRAVENOUS at 15:28

## 2020-11-17 RX ADMIN — DEXTROSE MONOHYDRATE 1 G: 5 INJECTION INTRAVENOUS at 01:31

## 2020-11-17 RX ADMIN — MORPHINE SULFATE 4 MG: 4 INJECTION INTRAVENOUS at 00:57

## 2020-11-17 RX ADMIN — AZITHROMYCIN MONOHYDRATE 500 MG: 500 INJECTION, POWDER, LYOPHILIZED, FOR SOLUTION INTRAVENOUS at 15:27

## 2020-11-17 RX ADMIN — ENOXAPARIN SODIUM 40 MG: 40 INJECTION SUBCUTANEOUS at 15:29

## 2020-11-17 RX ADMIN — OXYCODONE HYDROCHLORIDE AND ACETAMINOPHEN 2 TABLET: 5; 325 TABLET ORAL at 21:06

## 2020-11-17 RX ADMIN — ACETAMINOPHEN 1000 MG: 500 TABLET ORAL at 00:07

## 2020-11-17 RX ADMIN — OXYCODONE HYDROCHLORIDE AND ACETAMINOPHEN 2 TABLET: 5; 325 TABLET ORAL at 15:30

## 2020-11-17 RX ADMIN — DEXTROSE MONOHYDRATE 1 G: 5 INJECTION INTRAVENOUS at 06:39

## 2020-11-17 RX ADMIN — Medication 10 ML: at 21:07

## 2020-11-17 RX ADMIN — CEFTRIAXONE SODIUM 1 G: 1 INJECTION, POWDER, FOR SOLUTION INTRAMUSCULAR; INTRAVENOUS at 00:57

## 2020-11-17 RX ADMIN — SODIUM CHLORIDE: 9 INJECTION, SOLUTION INTRAVENOUS at 15:38

## 2020-11-17 ASSESSMENT — PAIN SCALES - GENERAL
PAINLEVEL_OUTOF10: 7
PAINLEVEL_OUTOF10: 8
PAINLEVEL_OUTOF10: 8
PAINLEVEL_OUTOF10: 4
PAINLEVEL_OUTOF10: 9
PAINLEVEL_OUTOF10: 9
PAINLEVEL_OUTOF10: 8

## 2020-11-17 ASSESSMENT — PAIN DESCRIPTION - ORIENTATION: ORIENTATION: LEFT

## 2020-11-17 ASSESSMENT — PAIN DESCRIPTION - PAIN TYPE: TYPE: ACUTE PAIN

## 2020-11-17 ASSESSMENT — PAIN DESCRIPTION - LOCATION: LOCATION: ABDOMEN

## 2020-11-17 NOTE — H&P
input(s): INR in the last 72 hours. Recent Labs     11/16/20 2122   TROPONINI <0.01       Urinalysis:      Lab Results   Component Value Date    NITRU Negative 11/16/2020    WBCUA 3-5 11/16/2020    BACTERIA 2+ 10/18/2020    RBCUA 0-2 11/16/2020    BLOODU MODERATE 11/16/2020    SPECGRAV >=1.030 11/16/2020    GLUCOSEU Negative 11/16/2020         ASSESSMENT:    Active Hospital Problems    Diagnosis Date Noted    Sepsis ([facility] [address] [A41.9] 11/17/2020    Pelvic inflammatory disease [N73.9] 11/17/2020    Hypothyroid [E03.9]     JEANNE (generalized anxiety disorder) [F41.1]     GERD (gastroesophageal reflux disease) [K21.9]     Morbid obesity ([facility] [address] [E66.01] 01/09/2015       PLAN:      Pelvic Inflammatory Disease - w/ purulent discharge/CMT and positive N. gonorrhoeae from 18 October. Subjective report of 'inflammed ovary' but CT scan negative for acute findings. Transferred from OSH for OB/GYN input - consulted and appreciated in advance. May have received ABX at OSH. Ordered Rocephin/Azithro DAILY here given severity of illness. Sepsis - w/ Leukocytosis/Tachycardia POArrival 2nd to above infection. Continue IVF as appropriate and monitor clinical response w/ ABX as written. HypoThyroid - clinically euthyroid on oral replacement therapy. Continue, w/ outpt monitoring as previously arranged. GERD - w/out active signs/sxs of dysphagia/odynophagia. No evidence of active PUD or hx of GI bleed. Controlled on home PPI - continue. Anxiety - tx w/ PRN PO Ativan. Morbid Obesity -  With There is no height or weight on file to calculate BMI. Complicating assessment and treatment. Placing patient at risk for multiple co-morbidities as well as early death and contributing to the patient's presentation. Counseled on weight loss. DVT Prophylaxis: LMWH  Diet: DIET GENERAL;  Code Status: Full Code      PT/OT Eval Status: not yet ordered.      Dispo - Possibly Wed/Thurs 18/19 Nov pending clinical course and OB/GYN recs.         Osmel Nicholson MD

## 2020-11-17 NOTE — ED PROVIDER NOTES
[address] ED  EMERGENCY DEPARTMENT ENCOUNTER        Pt Name: Kale Moseley  MRN: [id number]  [place] 2000  Date of evaluation: 11/16/2020  Provider: AZRA Mccarty  PCP: No primary care provider on file. This patient was seen and evaluated by the attending physician Cynthia Garnett MD.    I have evaluated this patient. My supervising physician was available for consultation. CHIEF COMPLAINT       Chief Complaint   Patient presents with    Abdominal Pain     left side front of abd pain       HISTORY OF PRESENT ILLNESS   (Location/Symptom, Timing/Onset, Context/Setting, Quality, Duration, Modifying Factors, Severity)  Note limiting factors. Kale Moseley is a 23 y.o. female who presents via private vehicle from her home for evaluation of left pelvic pain. Patient notes that it started last night however it is gotten more severe over the last day. She notes the pain is constant, dull intermittently sharp. Nothing seems to make it better or worse. She endorses mild nausea but no vomiting. She endorses being sexually active. She denies any dysuria hematuria urinary frequency urgency. She notes that she had an \"inflamed ovary\" several weeks ago and was put on antibiotics but did not finish the antibiotics. Her last menstrual period was last week, she notes there could be a chance that she is pregnant as she does not use protection. She denies any fevers body aches chills chest pain cough shortness of breath  runny nose nasal congestion sore throat headaches anosmia or dyschezia. She was apparently in contact with someone who has Covid. Nursing Notes were all reviewed and agreed with or any disagreements were addressed  in the HPI. Review of patient's past medical history reveals that she was seen and evaluated October 18, 2020 for similar symptoms.   CT is negative for an acute surgical process, however there is stranding noted to be around the left insecurity     Worry: None     Inability: None    Transportation needs     Medical: None     Non-medical: None   Tobacco Use    Smoking status: Current Every Day Smoker     Packs/day: 1.00    Smokeless tobacco: Never Used   Substance and Sexual Activity    Alcohol use: No     Alcohol/week: 0.0 standard drinks    Drug use: No    Sexual activity: Yes     Partners: Male   Lifestyle    Physical activity     Days per week: None     Minutes per session: None    Stress: None   Relationships    Social connections     Talks on phone: None     Gets together: None     Attends Voodoo service: None     Active member of club or organization: None     Attends meetings of clubs or organizations: None     Relationship status: None    Intimate partner violence     Fear of current or ex partner: None     Emotionally abused: None     Physically abused: None     Forced sexual activity: None   Other Topics Concern    None   Social History Narrative    None       SCREENINGS             PHYSICAL EXAM    (up to 7 for level 4, 8 or more for level 5)     ED Triage Vitals   BP Temp Temp Source Pulse Resp SpO2 Height Weight   11/16/20 2115 11/16/20 1846 11/16/20 1846 11/16/20 1846 11/16/20 1846 11/16/20 1846 11/16/20 1846 11/16/20 1846   137/75 97.7 °F (36.5 °C) Oral 113 20 99 % 5' 3\" (1.6 m) 277 lb (125.6 kg)       Physical Exam  Vitals signs and nursing note reviewed. Exam conducted with a chaperone present. Constitutional:       General: She is awake. She is in acute distress. Appearance: She is well-developed. She is ill-appearing. She is not toxic-appearing or diaphoretic. HENT:      Head: Normocephalic and atraumatic. Right Ear: External ear normal.      Left Ear: External ear normal.      Nose: Nose normal. No rhinorrhea. Mouth/Throat:      Mouth: Mucous membranes are moist.      Pharynx: Oropharynx is clear. Eyes:      General:         Right eye: No discharge. Left eye: No discharge. Extraocular Movements: Extraocular movements intact. Conjunctiva/sclera: Conjunctivae normal.      Pupils: Pupils are equal, round, and reactive to light. Neck:      Musculoskeletal: Normal range of motion and neck supple. No neck rigidity. Comments: No meningismus  Cardiovascular:      Rate and Rhythm: Normal rate and regular rhythm. Pulses:           Radial pulses are 2+ on the right side and 2+ on the left side. Posterior tibial pulses are 2+ on the right side and 2+ on the left side. Heart sounds: Normal heart sounds. No murmur. No friction rub. No gallop. Pulmonary:      Effort: Pulmonary effort is normal. No respiratory distress. Breath sounds: Normal breath sounds. No wheezing or rales. Abdominal:      General: Abdomen is protuberant. Bowel sounds are normal. There is no distension. Palpations: Abdomen is soft. Tenderness: There is abdominal tenderness in the left lower quadrant. There is no left CVA tenderness, guarding or rebound. Genitourinary:     Vagina: Vaginal discharge present. No bleeding. Cervix: Cervical motion tenderness, discharge and friability present. Adnexa: Right adnexa normal and left adnexa normal.      Comments: Copious mucopurulent greenish colored discharge emanating from the cervix  Musculoskeletal:      Right lower leg: No edema. Left lower leg: No edema. Lymphadenopathy:      Cervical: No cervical adenopathy. Skin:     General: Skin is warm and dry. Capillary Refill: Capillary refill takes less than 2 seconds. Neurological:      General: No focal deficit present. Mental Status: She is alert, oriented to person, place, and time and easily aroused. Sensory: No sensory deficit. Motor: No weakness. Psychiatric:         Mood and Affect: Mood normal.         Behavior: Behavior normal. Behavior is cooperative.            DIAGNOSTIC RESULTS   LABS:    Labs Reviewed   CBC WITH AUTO DIFFERENTIAL - Abnormal; Notable for the following components:       Result Value    WBC 17.6 (*)     MCV 78.5 (*)     MCH 25.4 (*)     Neutrophils Absolute 15.1 (*)     All other components within normal limits    Narrative:     Performed at:  [place] [address],  [employer]   Phone [phone]   COMPREHENSIVE METABOLIC PANEL W/ REFLEX TO MG FOR LOW K - Abnormal; Notable for the following components:    Sodium 134 (*)     Glucose 108 (*)     CREATININE <0.5 (*)     ALT 8 (*)     AST 10 (*)     All other components within normal limits    Narrative:     Performed at:  [place] [address],  [employer]   Phone [phone]   LIPASE - Abnormal; Notable for the following components:    Lipase 12.0 (*)     All other components within normal limits    Narrative:     Performed at:  [place] [address],  [employer]   Phone [phone]   URINALYSIS - Abnormal; Notable for the following components:    Clarity, UA CLOUDY (*)     Blood, Urine MODERATE (*)     Protein, UA 30 (*)     Leukocyte Esterase, Urine SMALL (*)     All other components within normal limits    Narrative:     Performed at:  [facility]) [facility],  [employer]   Phone [phone]   MICROSCOPIC URINALYSIS - Abnormal; Notable for the following components:    Epithelial Cells, UA 21-50 (*)     All other components within normal limits    Narrative:     Performed at:  [place] [address],  [employer]   Phone , [place]    Narrative:     Performed at:  [facility]) [facility],  [place]   Phone [phone]   CULTURE, URINE   C.TRACHOMATIS N.GONORRHOEAE DNA   CULTURE, BLOOD 1   CULTURE, BLOOD 2   TROPONIN    Narrative:     Performed at:  [facility],  [place], [facility]   Phone [phone]   HCG, SERUM, QUALITATIVE    Narrative:     Performed at:  [facility],  [place], [facility]   Phone [phone]   LACTIC ACID, PLASMA    Narrative:     Performed at:  [facility] [address],  [place], [facility]   Phone [phone]   LACTATE, SEPSIS    Narrative:     Performed at:  [facility]) - [facility],  [place], [facility]   Phone [phone]   LACTATE, SEPSIS   COVID-19       All other labs were within normal range or not returned as of this dictation. EKG: All EKG's are interpreted by the Emergency Department Physician who either signs orCo-signs this chart in the absence of a cardiologist.  Please see their note for interpretation of EKG. RADIOLOGY:   Non-plain film images such as CT, Ultrasound and MRI are read by the radiologist. Plain radiographic images are visualized andpreliminarily interpreted by the  ED Provider with the below findings:        Interpretation perthe Radiologist below, if available at the time of this note:    CT ABDOMEN PELVIS W IV CONTRAST Additional Contrast? None   Final Result   No acute finding in the abdomen or pelvis. Unchanged 4 mm nonobstructing calculus in the mid right kidney. Unchanged mildly enlarged nonspecific bilateral inguinal lymph nodes. US PELVIS COMPLETE   Final Result   No pelvic masses are identified. No acute abnormality is identified. XR CHEST (2 VW)   Final Result   No evidence for acute cardiopulmonary pathology.          US DUP ABD PEL RETRO SCROT COMPLETE    (Results Pending)     Xr Chest (2 Vw)    Result Date: 11/16/2020  EXAMINATION: TWO XRAY VIEWS OF THE CHEST 11/16/2020 7:16 pm COMPARISON: 08/06/2018 HISTORY: ORDERING SYSTEM PROVIDED HISTORY: abd pain, tachycardia TECHNOLOGIST PROVIDED HISTORY: Reason for exam:->abd pain, tachycardia Reason for Exam: abd pain FINDINGS: Frontal and lateral views of the chest are submitted for review. The cardiac silhouette is normal in size. Lung parenchyma is clear without focal airspace consolidation, sizeable pleural effusion, or pneumothorax. Trachea is midline. Osseous structures and soft tissues are grossly intact. No evidence for acute cardiopulmonary pathology. Us Pelvis Complete    Result Date: 11/16/2020  EXAMINATION: PELVIC ULTRASOUND 11/16/2020 TECHNIQUE: Transabdominal pelvic ultrasound was performed with color doppler flow evaluation. Patient declined transvaginal exam. COMPARISON: None HISTORY: ORDERING SYSTEM PROVIDED HISTORY: LLQ pain TECHNOLOGIST PROVIDED HISTORY: Reason for exam:->LLQ pain 2 day duration FINDINGS: Measurements: Uterus:  8.8 x 4.4 x 6.9 cm Endometrial stripe:  7.5 mm Right Ovary:  3.7 x 2.9 x 2.7 cm Left Ovary:  3.1 x 2.1 x 2.4 cm. Ultrasound Findings: Uterus: Uterus demonstrates normal myometrial echotexture. Endometrial stripe: Endometrial stripe is within normal limits. Right Ovary: Right ovary is within normal limits. Follicles are present. There is normal spectral Doppler. Left Ovary:  Left ovary is within normal limits. Follicles are present. There is normal spectral Doppler. Free Fluid: No evidence of free fluid. No pelvic masses are identified. No acute abnormality is identified. Ct Abdomen Pelvis W Iv Contrast Additional Contrast? None    Result Date: 11/16/2020  EXAMINATION: CT OF THE ABDOMEN AND PELVIS WITH CONTRAST 11/16/2020 10:20 pm TECHNIQUE: CT of the abdomen and pelvis was performed with the administration of intravenous contrast. Multiplanar reformatted images are provided for review. Dose modulation, iterative reconstruction, and/or weight based adjustment of the mA/kV was utilized to reduce the radiation dose to as low as reasonably achievable.  COMPARISON: 10/18/2020. HISTORY: ORDERING SYSTEM PROVIDED HISTORY: LLQ pain TECHNOLOGIST PROVIDED HISTORY: If patient is on cardiac monitor and/or pulse ox, they may be taken off cardiac monitor and pulse ox, left on O2 if currently on. All monitors reattached when patient returns to room. Additional Contrast?->None Reason for exam:->LLQ pain Reason for Exam: llq pain, US done prior Acuity: Acute Type of Exam: Initial FINDINGS: Lower Chest: The lung bases are clear and the heart size is normal.  Right lower lobe calcified granuloma. Organs: There are no calcified gallstones. No pericholecystic fluid or fat stranding. The liver, spleen, pancreas, adrenal glands and kidneys are normal.  There is an unchanged 4 mm nonobstructing calculus in the midpole of the right kidney. GI/Bowel: The appendix is normal.  Unopacified bowel loops are unremarkable. No bowel obstruction. Pelvis: The uterus, ovaries and urinary bladder are unremarkable. Peritoneum/Retroperitoneum: There are mildly prominent bilateral inguinal lymph nodes. There is no retroperitoneal adenopathy. No free air or free fluid. Bones/Soft Tissues: Obesity. No acute bone finding. No acute finding in the abdomen or pelvis. Unchanged 4 mm nonobstructing calculus in the mid right kidney. Unchanged mildly enlarged nonspecific bilateral inguinal lymph nodes.           PROCEDURES   Unless otherwise noted below, none     Procedures    CRITICAL CARE TIME   N/A    CONSULTS:  IP CONSULT TO HOSPITALIST  IP CONSULT TO HOSPITALIST      EMERGENCY DEPARTMENT COURSE and DIFFERENTIALDIAGNOSIS/MDM:   Vitals:    Vitals:    11/16/20 2115 11/16/20 2235 11/16/20 2332 11/16/20 2335   BP: 137/75 130/68  (!) 103/56   Pulse: 114 108  108   Resp: 20 20 20   Temp:   100.4 °F (38 °C)    TempSrc:   Oral    SpO2: 96% 95%  95%   Weight:       Height:           Patient was given thefollowing medications:  Medications   morphine sulfate (PF) injection 4 mg (4 mg Intravenous Given 11/16/20 2309) doxycycline (VIBRAMYCIN) 100 mg in dextrose 5 % 100 mL IVPB (100 mg Intravenous New Bag 11/16/20 2309)   cefTRIAXone (ROCEPHIN) 1 g IVPB in 50 mL D5W minibag (has no administration in time range)   cefOXitin (MEFOXIN) 1 g in dextrose 5% 50 mL (mini-bag) (has no administration in time range)   0.9 % sodium chloride bolus (1,000 mLs Intravenous New Bag 11/16/20 2202)   ketorolac (TORADOL) injection 15 mg (15 mg Intravenous Given 11/16/20 2201)   ondansetron (ZOFRAN) injection 4 mg (4 mg Intravenous Given 11/16/20 2201)   iopamidol (ISOVUE-370) 76 % injection 75 mL (75 mLs Intravenous Given 11/16/20 2227)   0.9 % sodium chloride IV bolus 1,000 mL (1,000 mLs Intravenous New Bag 11/16/20 2310)   acetaminophen (TYLENOL) tablet 1,000 mg (1,000 mg Oral Given 11/17/20 0007)       PDMP Monitoring:    Last PDMP Zach as Reviewed [facility]):  Review User Review Instant Review Result            Urine Drug Screenings (1 yr)     No resulted procedures found. Medication Contract and Consent for Opioid Use Documents Filed      No documents found                MDM:   Patient seen and evaluated. Old records reviewed. Diagnostic testing reviewed and results discussed. Patient is a 71-year-old female who presents for evaluation of left lower abdominal pain. On exam she is alert oriented afebrile well-perfused however she is tachycardic. She is not diaphoretic. She has left lower quadrant abdominal pain without rebound or guarding. Given her past medical history of ovarian cysts and \"inflamed ovary\" she was sent stat for pelvic ultrasound which was negative for pelvic masses revealed good Doppler flow bilateral lead to ovaries and was negative for other acute finding. Labs urinalysis CT abdomen pelvis were obtained.   Pelvic exam was very concerning for pelvic inflammatory disease given her positive gonorrhea culture and the mucopurulent discharge appreciated on exam.  I reevaluated the patient she was warm and she continued to be tachycardic, I took her temperature orally it was 100.4. Patient is meeting sirs sepsis criteria. She has a leukocytosis, she has pyuria. Source of infection is pelvic inflammatory disease. CT abdomen pelvis is negative for acute findings. She has a an unchanged 4 mm nonobstructing calculus in the right mid kidney and unchanged mildly enlarged nonspecific bilateral inguinal lymph nodes which is likely secondary to her pelvic inflammatory infection. Patient started on IV antibiotics. I spoke with our hospitalist who refused the patient as she will require OB/GYN coverage and infectious disease coverage. Page sent out for hospitalist at [facility].     SEP-1 CORE MEASURE DATA    Classification: sepsis    Amount of fluids ordered: at least 30mL/kg based on ideal body weight due to obesity defined as BMI >30 (patient's BMI is Body mass index is 49.07 kg/m². and IBW is Ideal body weight: 52.4 kg (115 lb 8.3 oz)Adjusted ideal body weight: 81.7 kg (180 lb 1.8 oz))    Time at which sepsis was identified: 1030p    Broad-spectrum antibiotics chosen:  based on suspected source of: PID    Repeat lactate level: not indicated    On reassessment after fluid resuscitation:   pending, to be completed by inpatient team      I spoke with Dr. Stephany Aguilera , [place] [address]. We thoroughly discussed the history, physical exam, laboratory and imaging studies, as well as, current course of treatment within the emergency department. Based upon that discussion, we've decided to transfer Nadeem Le to [facility], for further observation and evaluation of Nadeem Le current condition. As I have deemed necessary from their history, physical, and studies, I have considered and evaluated Nadeem Le for the following diagnoses:      CLINICAL IMPRESSION:  1. Female pelvic inflammatory disease    2.  Septicemia ([facility]        BP (!) 103/56   Pulse 108   Temp 100.4 °F (38 °C) (Oral)   Resp 20   Ht 5' 3\" (1.6 m) Wt 277 lb (125.6 kg)   LMP 11/08/2020   SpO2 95%   BMI 49.07 kg/m²       FINAL IMPRESSION      1. Female pelvic inflammatory disease    2. Septicemia ([facility] [address]          DISPOSITION/PLAN   DISPOSITION        PATIENT REFERREDTO:  No follow-up provider specified.     DISCHARGE MEDICATIONS:  New Prescriptions    No medications on file       DISCONTINUED MEDICATIONS:  Discontinued Medications    No medications on file              (Please note that portions ofthis note were completed with a voice recognition program.  Efforts were made to edit the dictations but occasionally words are mis-transcribed.)    Brit Owesn (electronically signed)       Brit Owens  11/17/20 5581

## 2020-11-17 NOTE — ED PROVIDER NOTES
I did not see or evaluate this patient. Patient's listed chief complaint is abdominal pain. Patient came in tachycardic heart rate 113. I was asked to evaluate this patient's EKG      The Ekg interpreted by me shows  sinus tachycardia, [id number] with a rate of   Axis is   Left axis deviation  QTc is  normal  Intervals and Durations are unremarkable.       ST Segments: normal    No significant change from prior EKG dated none    Claudine Russo   ER Physician            Toni Barclay DO  11/16/20 6451

## 2020-11-17 NOTE — CONSULTS
Department of Gynecology  Consult Note      Reason for Consult:  Abdominal pain/PID  Requesting Physician:  hospitalist    CHIEF COMPLAINT:   Abdominal/pelvic pain    History obtained from patient    HISTORY OF PRESENT ILLNESS:     The patient is a 23 y.o. female with significant past medical history of none who presents with increasing pelvic pain and in llq over the past 3 days. Pt gives hx of being in ER 3 weeks ago for similar complaints. Urine culture at that time showed positive for gonorrhea. Pt denies being told or taking any antibiotics for that infection. She reports white to yellow discharge. She reports subjective fever. She can tolerate po. Past Medical History:        Diagnosis Date    Attention deficit disorder with hyperactivity(314.01)     JEANNE (generalized anxiety disorder)     GERD (gastroesophageal reflux disease)     Gonorrhea 10/18/2020    Hypothyroid     PTSD (post-traumatic stress disorder)     Severe recurrent major depression with psychotic features ([facility] [address]      Past Surgical History:    No past surgical history on file.       meds:  Current Facility-Administered Medications:     oxyCODONE-acetaminophen (PERCOCET) 5-325 MG per tablet 1 tablet, 1 tablet, Oral, Q4H PRN **OR** oxyCODONE-acetaminophen (PERCOCET) 5-325 MG per tablet 2 tablet, 2 tablet, Oral, Q4H PRN, Ray Oreilly MD, 2 tablet at 11/17/20 1530    HYDROmorphone (DILAUDID) injection 0.5 mg, 0.5 mg, Intravenous, Q4H PRN **OR** HYDROmorphone (DILAUDID) injection 1 mg, 1 mg, Intravenous, Q4H PRN, Ray Oreilly MD    cefTRIAXone (ROCEPHIN) 1 g IVPB in 50 mL D5W minibag, 1 g, Intravenous, Daily, Ray Oreilly MD, Stopped at 11/17/20 1603    azithromycin (ZITHROMAX) 500 mg in D5W 250ml addavial, 500 mg, Intravenous, Daily, Ray Oreilly MD, Last Rate: 250 mL/hr at 11/17/20 1527, 500 mg at 11/17/20 1527    0.9 % sodium chloride infusion, , Intravenous, Continuous, Ray Oreilly MD, Last Rate: 100 mL/hr at impulse, regular rate and rhythm, normal S1 and S2, no S3 or S4, and no murmur noted  ABDOMEN:  Voluntary guarding, moderate tenderness diffuse in bilateral lower quadrants, BS+, soft    MUSCULOSKELETAL:  there is no redness, warmth, or swelling of the joints  NEUROLOGIC:  Awake, alert, oriented to name, place and time. Cranial nerves II-XII are grossly intact. Motor is 5 out of 5 bilaterally. Cerebellar finger to nose, heel to shin intact. Sensory is intact. Babinski down going, Romberg negative, and gait is normal.  SKIN:  normal skin color, texture, turgor  Pelvic exam: deferred  DATA:  Recent Labs     11/16/20 2122   WBC 17.6*   HGB 12.0   HCT 37.1        Recent Labs     11/16/20 2122   *   K 3.8      CO2 23   BUN 14   CREATININE <0.5*   CALCIUM 9.4   AST 10*   ALT 8*       U/S: WNL    IMPRESSION/RECOMMENDATIONS:      Principal Problem:    Pelvic inflammatory disease  Plan: continue antibiotics Ceftriaxone daily           Consider Zithromax 1g q week x2 doses. will continue to follow.

## 2020-11-17 NOTE — CARE COORDINATION
Chart review completed. Patient is a 23year old female insured with [employer] [facility]. Admitted for evaluation of left pelvic pain. Exam concerning pelvic inflammatory disease in setting of positive gonorrhea. Patient started on IVABX's. IPTA. Will monitor hospital course for need for ongoing IVABX's. Currently being treated for sepsis.

## 2020-11-17 NOTE — ED NOTES
Called back [facility] for update on Transport with [employer]. At [facility] 75 said he would dispatch someone out then. Called [phone] - to see what they found out and they called Yuliana Asencio with [employer] and he says 15 mins      Priscilla Wu  11/17/20 1008    1026 - Called [facility] to tell them unit still wasn't here. 1029 - 5 more minutes they said ran into traffic  1038 - [facility] here for transport.       Priscilla Wu  11/17/20 1106

## 2020-11-17 NOTE — ED PROVIDER NOTES
I independently examined and evaluated Carol Cabrera. In brief, patient is a 56-year-old female who presents to the emergency department for evaluation of left lower quadrant pain. Focused exam revealed febrile patient, tachycardic, in mild distress, and tenderness to palpation over the left lower quadrant without guarding or rebound. Patient given 2 L IV fluid bolus. Work-up remarkable for leukocytosis with left shift. Troponin less than 0.01. Lipase 12. Pregnancy test negative. CT abdomen pelvis with IV contrast showed no acute finding in the abdomen or pelvis. Ultrasound showed no pelvic masses. Pelvic exam obtained by Ramona Mendes. Please refer to her note for details. Per chart review, patient found to have gonorrhea on her urine sent on October 18. Patient given empiric antibiotics. Hospitalist consulted for admission. Hospitalist referred the patient to [facility] as no OB/GYN at [facility]. Patient graciously accepted by Dr. Dung Zamarripa at [facility]. Patient's vitals improved on reassessment. Awaiting transport. All diagnostic, treatment, and disposition decisions were made by myself in conjunction with the advanced practice provider. For all further details of the patient's emergency department visit, please see the advanced practice provider's documentation. Comment: Please note this report has been produced using speech recognition software and may contain errors related to that system including errors in grammar, punctuation, and spelling, as well as words and phrases that may be inappropriate. If there are any questions or concerns please feel free to contact the dictating provider for clarification.         Fallon Mendoza MD  11/17/20 7803

## 2020-11-18 VITALS
TEMPERATURE: 97.8 F | HEART RATE: 67 BPM | OXYGEN SATURATION: 97 % | RESPIRATION RATE: 18 BRPM | BODY MASS INDEX: 49.61 KG/M2 | WEIGHT: 280 LBS | HEIGHT: 63 IN | SYSTOLIC BLOOD PRESSURE: 102 MMHG | DIASTOLIC BLOOD PRESSURE: 66 MMHG

## 2020-11-18 LAB
ALBUMIN SERPL-MCNC: 3.7 G/DL (ref 3.4–5)
ANION GAP SERPL CALCULATED.3IONS-SCNC: 8 MMOL/L (ref 3–16)
BUN BLDV-MCNC: 8 MG/DL (ref 7–20)
CALCIUM SERPL-MCNC: 8.9 MG/DL (ref 8.3–10.6)
CHLORIDE BLD-SCNC: 107 MMOL/L (ref 99–110)
CO2: 25 MMOL/L (ref 21–32)
CREAT SERPL-MCNC: <0.5 MG/DL (ref 0.6–1.1)
GFR AFRICAN AMERICAN: >60
GFR NON-AFRICAN AMERICAN: >60
GLUCOSE BLD-MCNC: 91 MG/DL (ref 70–99)
HCT VFR BLD CALC: 38.2 % (ref 36–48)
HEMOGLOBIN: 12.2 G/DL (ref 12–16)
MCH RBC QN AUTO: 25.3 PG (ref 26–34)
MCHC RBC AUTO-ENTMCNC: 31.9 G/DL (ref 31–36)
MCV RBC AUTO: 79.3 FL (ref 80–100)
PDW BLD-RTO: 15.7 % (ref 12.4–15.4)
PHOSPHORUS: 3.2 MG/DL (ref 2.5–4.9)
PLATELET # BLD: 313 K/UL (ref 135–450)
PMV BLD AUTO: 8.4 FL (ref 5–10.5)
POTASSIUM SERPL-SCNC: 3.9 MMOL/L (ref 3.5–5.1)
RBC # BLD: 4.82 M/UL (ref 4–5.2)
SODIUM BLD-SCNC: 140 MMOL/L (ref 136–145)
URINE CULTURE, ROUTINE: NORMAL
WBC # BLD: 8.6 K/UL (ref 4–11)

## 2020-11-18 PROCEDURE — 90686 IIV4 VACC NO PRSV 0.5 ML IM: CPT | Performed by: INTERNAL MEDICINE

## 2020-11-18 PROCEDURE — 85027 COMPLETE CBC AUTOMATED: CPT

## 2020-11-18 PROCEDURE — 96372 THER/PROPH/DIAG INJ SC/IM: CPT

## 2020-11-18 PROCEDURE — 6370000000 HC RX 637 (ALT 250 FOR IP): Performed by: INTERNAL MEDICINE

## 2020-11-18 PROCEDURE — 80069 RENAL FUNCTION PANEL: CPT

## 2020-11-18 PROCEDURE — G0008 ADMIN INFLUENZA VIRUS VAC: HCPCS | Performed by: INTERNAL MEDICINE

## 2020-11-18 PROCEDURE — 96366 THER/PROPH/DIAG IV INF ADDON: CPT

## 2020-11-18 PROCEDURE — 36415 COLL VENOUS BLD VENIPUNCTURE: CPT

## 2020-11-18 PROCEDURE — G0378 HOSPITAL OBSERVATION PER HR: HCPCS

## 2020-11-18 PROCEDURE — 2580000003 HC RX 258: Performed by: INTERNAL MEDICINE

## 2020-11-18 PROCEDURE — 6360000002 HC RX W HCPCS: Performed by: INTERNAL MEDICINE

## 2020-11-18 RX ORDER — OXYCODONE HYDROCHLORIDE AND ACETAMINOPHEN 5; 325 MG/1; MG/1
1 TABLET ORAL EVERY 6 HOURS PRN
Qty: 12 TABLET | Refills: 0 | Status: SHIPPED | OUTPATIENT
Start: 2020-11-18 | End: 2020-11-21

## 2020-11-18 RX ORDER — AZITHROMYCIN 250 MG/1
500 TABLET, FILM COATED ORAL ONCE
Status: COMPLETED | OUTPATIENT
Start: 2020-11-18 | End: 2020-11-18

## 2020-11-18 RX ADMIN — Medication 10 ML: at 09:43

## 2020-11-18 RX ADMIN — CEFTRIAXONE SODIUM 1 G: 1 INJECTION, POWDER, FOR SOLUTION INTRAMUSCULAR; INTRAVENOUS at 09:42

## 2020-11-18 RX ADMIN — OXYCODONE HYDROCHLORIDE AND ACETAMINOPHEN 2 TABLET: 5; 325 TABLET ORAL at 02:01

## 2020-11-18 RX ADMIN — OXYCODONE HYDROCHLORIDE AND ACETAMINOPHEN 2 TABLET: 5; 325 TABLET ORAL at 08:15

## 2020-11-18 RX ADMIN — ENOXAPARIN SODIUM 40 MG: 40 INJECTION SUBCUTANEOUS at 09:42

## 2020-11-18 RX ADMIN — AZITHROMYCIN MONOHYDRATE 500 MG: 500 INJECTION, POWDER, LYOPHILIZED, FOR SOLUTION INTRAVENOUS at 08:17

## 2020-11-18 RX ADMIN — AZITHROMYCIN MONOHYDRATE 500 MG: 250 TABLET ORAL at 12:40

## 2020-11-18 RX ADMIN — INFLUENZA A VIRUS A/VICTORIA/2454/2019 IVR-207 (H1N1) ANTIGEN (PROPIOLACTONE INACTIVATED), INFLUENZA A VIRUS A/HONG KONG/2671/2019 IVR-208 (H3N2) ANTIGEN (PROPIOLACTONE INACTIVATED), INFLUENZA B VIRUS B/VICTORIA/705/2018 BVR-11 ANTIGEN (PROPIOLACTONE INACTIVATED), INFLUENZA B VIRUS B/PHUKET/3073/2013 BVR-1B ANTIGEN (PROPIOLACTONE INACTIVATED) 0.5 ML: 15; 15; 15; 15 INJECTION, SUSPENSION INTRAMUSCULAR at 15:34

## 2020-11-18 ASSESSMENT — PAIN SCALES - GENERAL
PAINLEVEL_OUTOF10: 10
PAINLEVEL_OUTOF10: 10

## 2020-11-18 NOTE — CARE COORDINATION
Discharge orders noted. Patient has no needs for case management and will discharge home to prior level of independence. [facility] RN confirmed patient has no needs.

## 2020-11-19 NOTE — DISCHARGE SUMMARY
Hospital Medicine Discharge Summary    Patient ID: Sina Laurent      Patient's PCP: No primary care provider on file. Admit Date: 11/17/2020     Discharge Date: 11/18/2020      Admitting Physician: Jin Jenkins MD     Discharge Physician: Jin Jenkins MD     Discharge Diagnoses: Active Hospital Problems    Diagnosis    Sepsis ([facility] [address] [A41.9]    Pelvic inflammatory disease [N73.9]    Hypothyroid [E03.9]    JEANNE (generalized anxiety disorder) [F41.1]    GERD (gastroesophageal reflux disease) [K21.9]    Morbid obesity ([facility] [address] [E66.01]       The patient was seen and examined on day of discharge and this discharge summary is in conjunction with any daily progress note from day of discharge. Hospital Course:       Pelvic Inflammatory Disease - w/ purulent discharge/CMT and positive N. gonorrhoeae from 18 October.  Subjective report of 'inflammed ovary' but CT scan negative for acute findings.  Transferred from OSH for OB/GYN input - consulted and appreciated in advance.  May have received ABX at OSH.  Ordered Rocephin/Azithro DAILY here given severity of illness - additional PO Azithro ordered for total 1 gram 18 Nov - now completed.      Sepsis - w/ Leukocytosis/Tachycardia POArrival 2nd to above infection.  Continue IVF as appropriate and monitor clinical response w/ ABX as written. Leukocytosis resolved.      HypoThyroid - clinically euthyroid on oral replacement therapy. Continue, w/ outpt monitoring as previously arranged.      GERD - w/out active signs/sxs of dysphagia/odynophagia. No evidence of active PUD or hx of GI bleed. Controlled on home PPI - continue.     Anxiety - tx w/ PRN PO Ativan.      Morbid Obesity -  With There is no height or weight on file to calculate BMI. Complicating assessment and treatment. Placing patient at risk for multiple co-morbidities as well as early death and contributing to the patient's presentation. Counseled on weight loss.        Labs:  For convenience and continuity at follow-up the following most recent labs are provided:      CBC:    Lab Results   Component Value Date    WBC 8.6 11/18/2020    HGB 12.2 11/18/2020    HCT 38.2 11/18/2020     11/18/2020       Renal:    Lab Results   Component Value Date     11/18/2020    K 3.9 11/18/2020    K 3.8 11/16/2020     11/18/2020    CO2 25 11/18/2020    BUN 8 11/18/2020    CREATININE <0.5 11/18/2020    CALCIUM 8.9 11/18/2020    PHOS 3.2 11/18/2020         Significant Diagnostic Studies    Radiology:   No orders to display          Consults:     IP CONSULT TO OB GYN    Disposition: Home    Condition at Discharge: Stable    Discharge Instructions/Follow-up:  w/ PCP 1-2 weeks and subspecialists as arranged. Code Status:  Full Code    Activity: activity as tolerated    Diet: regular diet      Discharge Medications:     Discharge Medication List as of 11/18/2020  3:41 PM           Details   oxyCODONE-acetaminophen (PERCOCET) 5-325 MG per tablet Take 1 tablet by mouth every 6 hours as needed for Pain for up to 3 days. , Disp-12 tablet,R-0Print              Details   ibuprofen (IBU) 600 MG tablet Take 1 tablet by mouth every 6 hours as needed for Pain, Disp-20 tablet,R-0Print      terbinafine (ATHLETES FOOT) 1 % cream Apply topically 2 times daily. , Disp-42 g, R-0, Print             Time Spent on discharge is more than 30 minutes in the examination, evaluation, counseling and review of medications and discharge plan. Signed:    Camacho Hathaway MD   11/19/2020      Thank you No primary care provider on file. for the opportunity to be involved in this patient's care. If you have any questions or concerns please feel free to contact me at [phone].

## 2020-11-20 LAB
C TRACH DNA GENITAL QL NAA+PROBE: NEGATIVE
N. GONORRHOEAE DNA: POSITIVE

## 2020-11-21 LAB
BLOOD CULTURE, ROUTINE: NORMAL
CULTURE, BLOOD 2: NORMAL

## 2021-01-22 ENCOUNTER — HOSPITAL ENCOUNTER (EMERGENCY)
Age: 21
Discharge: HOME | End: 2021-01-22
Attending: EMERGENCY MEDICINE
Payer: MEDICAID

## 2021-01-22 VITALS
SYSTOLIC BLOOD PRESSURE: 115 MMHG | HEIGHT: 63 IN | RESPIRATION RATE: 12 BRPM | TEMPERATURE: 98.5 F | HEART RATE: 92 BPM | DIASTOLIC BLOOD PRESSURE: 62 MMHG | WEIGHT: 284 LBS | OXYGEN SATURATION: 99 % | BODY MASS INDEX: 50.32 KG/M2

## 2021-01-22 DIAGNOSIS — Z20.2 POSSIBLE EXPOSURE TO STD: ICD-10-CM

## 2021-01-22 DIAGNOSIS — A59.01 TRICHOMONAL VAGINITIS: ICD-10-CM

## 2021-01-22 DIAGNOSIS — N39.0 URINARY TRACT INFECTION WITHOUT HEMATURIA, SITE UNSPECIFIED: Primary | ICD-10-CM

## 2021-01-22 LAB
AMORPHOUS: ABNORMAL /HPF
BACTERIA WET PREP: ABNORMAL
BACTERIA: ABNORMAL /HPF
BILIRUBIN URINE: NEGATIVE
BLOOD, URINE: ABNORMAL
CLARITY: ABNORMAL
CLUE CELLS: ABNORMAL
COLOR: YELLOW
EPITHELIAL CELLS WET PREP: ABNORMAL
EPITHELIAL CELLS, UA: ABNORMAL /HPF (ref 0–5)
GLUCOSE URINE: NEGATIVE MG/DL
HCG(URINE) PREGNANCY TEST: NEGATIVE
KETONES, URINE: NEGATIVE MG/DL
LEUKOCYTE ESTERASE, URINE: ABNORMAL
MICROSCOPIC EXAMINATION: YES
NITRITE, URINE: NEGATIVE
PH UA: 6 (ref 5–8)
PROTEIN UA: ABNORMAL MG/DL
RBC UA: ABNORMAL /HPF (ref 0–4)
RBC WET PREP: ABNORMAL
SOURCE WET PREP: ABNORMAL
SPECIFIC GRAVITY UA: >=1.03 (ref 1–1.03)
TRICHOMONAS PREP: ABNORMAL
TRICHOMONAS: PRESENT /HPF
URINE REFLEX TO CULTURE: YES
URINE TYPE: ABNORMAL
UROBILINOGEN, URINE: 0.2 E.U./DL
WBC UA: >100 /HPF (ref 0–5)
WBC WET PREP: ABNORMAL
YEAST WET PREP: ABNORMAL

## 2021-01-22 PROCEDURE — 6370000000 HC RX 637 (ALT 250 FOR IP): Performed by: EMERGENCY MEDICINE

## 2021-01-22 PROCEDURE — 87210 SMEAR WET MOUNT SALINE/INK: CPT

## 2021-01-22 PROCEDURE — 2500000003 HC RX 250 WO HCPCS

## 2021-01-22 PROCEDURE — 81001 URINALYSIS AUTO W/SCOPE: CPT

## 2021-01-22 PROCEDURE — 87086 URINE CULTURE/COLONY COUNT: CPT

## 2021-01-22 PROCEDURE — 87491 CHLMYD TRACH DNA AMP PROBE: CPT

## 2021-01-22 PROCEDURE — 96372 THER/PROPH/DIAG INJ SC/IM: CPT

## 2021-01-22 PROCEDURE — 87591 N.GONORRHOEAE DNA AMP PROB: CPT

## 2021-01-22 PROCEDURE — 84703 CHORIONIC GONADOTROPIN ASSAY: CPT

## 2021-01-22 PROCEDURE — 87077 CULTURE AEROBIC IDENTIFY: CPT

## 2021-01-22 PROCEDURE — 6360000002 HC RX W HCPCS: Performed by: EMERGENCY MEDICINE

## 2021-01-22 PROCEDURE — 99283 EMERGENCY DEPT VISIT LOW MDM: CPT

## 2021-01-22 RX ORDER — DOXYCYCLINE 100 MG/1
100 CAPSULE ORAL ONCE
Status: COMPLETED | OUTPATIENT
Start: 2021-01-22 | End: 2021-01-22

## 2021-01-22 RX ORDER — LIDOCAINE HYDROCHLORIDE 10 MG/ML
INJECTION, SOLUTION EPIDURAL; INFILTRATION; INTRACAUDAL; PERINEURAL
Status: COMPLETED
Start: 2021-01-22 | End: 2021-01-22

## 2021-01-22 RX ORDER — METRONIDAZOLE 500 MG/1
2000 TABLET ORAL ONCE
Status: COMPLETED | OUTPATIENT
Start: 2021-01-22 | End: 2021-01-22

## 2021-01-22 RX ORDER — CEFTRIAXONE 1 G/1
500 INJECTION, POWDER, FOR SOLUTION INTRAMUSCULAR; INTRAVENOUS ONCE
Status: COMPLETED | OUTPATIENT
Start: 2021-01-22 | End: 2021-01-22

## 2021-01-22 RX ORDER — DOXYCYCLINE HYCLATE 100 MG
100 TABLET ORAL 2 TIMES DAILY
Qty: 14 TABLET | Refills: 0 | Status: SHIPPED | OUTPATIENT
Start: 2021-01-22 | End: 2021-01-29

## 2021-01-22 RX ORDER — PHENAZOPYRIDINE HYDROCHLORIDE 200 MG/1
200 TABLET, FILM COATED ORAL 3 TIMES DAILY PRN
Qty: 9 TABLET | Refills: 0 | Status: SHIPPED | OUTPATIENT
Start: 2021-01-22 | End: 2021-01-25

## 2021-01-22 RX ORDER — CEPHALEXIN 500 MG/1
500 CAPSULE ORAL 3 TIMES DAILY
Qty: 20 CAPSULE | Refills: 0 | Status: SHIPPED | OUTPATIENT
Start: 2021-01-22 | End: 2021-01-29

## 2021-01-22 RX ADMIN — CEFTRIAXONE 500 MG: 1 INJECTION, POWDER, FOR SOLUTION INTRAMUSCULAR; INTRAVENOUS at 02:52

## 2021-01-22 RX ADMIN — DOXYCYCLINE 100 MG: 100 CAPSULE ORAL at 03:01

## 2021-01-22 RX ADMIN — METRONIDAZOLE 2000 MG: 500 TABLET ORAL at 03:01

## 2021-01-22 RX ADMIN — LIDOCAINE HYDROCHLORIDE 1 ML: 10 INJECTION, SOLUTION EPIDURAL; INFILTRATION; INTRACAUDAL; PERINEURAL at 03:02

## 2021-01-22 NOTE — ED NOTES
Pt dc/d with instructions in stable condition, ambulatory to [facility]. Home per ride.       Glenny Perales RN  01/22/21 9304

## 2021-01-22 NOTE — ED PROVIDER NOTES
[facility] EMERGENCY DEPT VISIT      Patient Identification  Francisco Woods is a 23 y.o. female. Chief Complaint   Urinary Tract Infection (burning , freq, x 3 wks , white d/c, poss std)      History of Present Illness: This is a  23 y.o. female who presents ambulatory  to the ED with complaints of 3-week history of dysuria, frequency, urgency, and small amounts. She has had a little bit of white vaginal discharge. No fever. No abdominal pain. No flank pain. No nausea vomiting. No diarrhea. She does have a history of PID in the past and gonorrhea on 2 occasions and states that she has been sexually active with the same partner that she believes gave her gonorrhea before. She has taken no medications for the symptoms. Patient also states he has had decreased hearing out of the left ear for a long time now and would like to have that looked at. She states that she does get some drainage from both of her ears. She has had myringotomy tubes on a couple of occasions but not for about 10 years. She denies sinus congestion, sore throat, cough, fever. Past Medical History:   Diagnosis Date    Attention deficit disorder with hyperactivity(314.01)     JEANNE (generalized anxiety disorder)     GERD (gastroesophageal reflux disease)     Gonorrhea 10/18/2020    Hypothyroid     PTSD (post-traumatic stress disorder)     Severe recurrent major depression with psychotic features ([facility] [address]        History reviewed. No pertinent surgical history.       Current Facility-Administered Medications:     lidocaine PF 1 % injection, , , ,     doxycycline monohydrate (MONODOX) capsule 100 mg, 100 mg, Oral, Once, Amando Ashley MD    metroNIDAZOLE (FLAGYL) tablet 2,000 mg, 2,000 mg, Oral, Once, Amando Ashley MD    Current Outpatient Medications:     doxycycline hyclate (VIBRA-TABS) 100 MG tablet, Take 1 tablet by mouth 2 times daily for 7 days, Disp: 14 tablet, Rfl: 0   cephALEXin (KEFLEX) 500 MG capsule, Take 1 capsule by mouth 3 times daily for 7 days, Disp: 20 capsule, Rfl: 0    phenazopyridine (PYRIDIUM) 200 MG tablet, Take 1 tablet by mouth 3 times daily as needed for Pain, Disp: 9 tablet, Rfl: 0    Allergies   Allergen Reactions    Augmentin [Amoxicillin-Pot Clavulanate] Other (See Comments)     unknown       Social History     Socioeconomic History    Marital status: Single     Spouse name: Not on file    Number of children: Not on file    Years of education: Not on file    Highest education level: Not on file   Occupational History    Not on file   Social Needs    Financial resource strain: Not on file    Food insecurity     Worry: Not on file     Inability: Not on file    Transportation needs     Medical: Not on file     Non-medical: Not on file   Tobacco Use    Smoking status: Current Every Day Smoker     Packs/day: 1.00    Smokeless tobacco: Never Used   Substance and Sexual Activity    Alcohol use: No     Alcohol/week: 0.0 standard drinks    Drug use: No    Sexual activity: Yes     Partners: Male   Lifestyle    Physical activity     Days per week: Not on file     Minutes per session: Not on file    Stress: Not on file   Relationships    Social connections     Talks on phone: Not on file     Gets together: Not on file     Attends Spiritism service: Not on file     Active member of club or organization: Not on file     Attends meetings of clubs or organizations: Not on file     Relationship status: Not on file    Intimate partner violence     Fear of current or ex partner: Not on file     Emotionally abused: Not on file     Physically abused: Not on file     Forced sexual activity: Not on file   Other Topics Concern    Not on file   Social History Narrative    Not on file       Nursing Notes Reviewed      ROS:  General: no fever  ENT: no sinus congestion, no sore throat, +ear pain  RESP: no cough, no shortness of breath  CARDIAC: no chest pain GI: no abdominal pain, no vomiting, no diarrhea  : + dysuria, no hematuria, no retention, no incontinence  Musculoskeletal: no arthralgia, no myalgia, no back pain,  no joint swelling  NEURO: no headache, no numbness, no weakness, no dizziness  DERM: no rash, no erythema, no ecchymosis, no wounds      PHYSICAL EXAM:  GENERAL APPEARANCE: Kitty Gould is in no acute respiratory distress. Awake and alert. VITAL SIGNS:   ED Triage Vitals [01/22/21 0146]   Enc Vitals Group      /62      Pulse 92      Resp 12      Temp 98.5 °F (36.9 °C)      Temp Source Oral      SpO2 99 %      Weight 284 lb (128.8 kg)      Height 5' 3\" (1.6 m)      Head Circumference       Peak Flow       Pain Score       Pain Loc       Pain Edu? Excl. in [address]? HEAD: Normocephalic, atraumatic. EYES:  Extraocular muscles are intact. Conjunctivas are pink. Negative scleral icterus. ENT:  Mucous membranes are moist.  Pharynx without erythema or exudates. TMs show no erythema. Scarred TMs bilaterally. Canals clear. NECK: Nontender and supple. CHEST: Clear to auscultation bilaterally. No rales, rhonchi, or wheezing. HEART:  Regular rate and rhythm. No murmurs. Strong and equal pulses in the upper and lower extremities. ABDOMEN: Soft,  nondistended, positive bowel sounds. abdomen is nontender. No guarding. No rebound. No flank tenderness. MUSCULOSKELETAL:  Active range of motion of the upper and lower extremities. No edema. NEUROLOGICAL: Awake, alert and oriented x 3. Power intact in the upper and lower extremities. DERMATOLOGIC: No petechiae, rashes, or ecchymoses. ED COURSE AND MEDICAL DECISION MAKING:      Radiology:  All plain films have been evaluated by myself. They may have been overread by radiologist as noted in chart.  Other radiologic studies (i.e. CT, MRI, ultrasounds, etc ) have been interpreted by radiologist.     No orders to display       Labs: Results for orders placed or performed during the hospital encounter of 01/22/21   Wet prep, genital    Specimen: Vaginal   Result Value Ref Range    Trichomonas Prep PRESENT  2+ (A)     Yeast, Wet Prep None Seen     Clue Cells, Wet Prep None Seen     WBC, Wet Prep 2+     RBC, Wet Prep 1+     Epi Cells 2+     Bacteria 3+     Source Wet Prep Vaginal    Urinalysis Reflex to Culture    Specimen: Urine, clean catch   Result Value Ref Range    Color, UA Yellow Straw/Yellow    Clarity, UA CLOUDY (A) Clear    Glucose, Ur Negative Negative mg/dL    Bilirubin Urine Negative Negative    Ketones, Urine Negative Negative mg/dL    Specific Gravity, UA >=1.030 1.005 - 1.030    Blood, Urine SMALL (A) Negative    pH, UA 6.0 5.0 - 8.0    Protein, UA TRACE (A) Negative mg/dL    Urobilinogen, Urine 0.2 <2.0 E.U./dL    Nitrite, Urine Negative Negative    Leukocyte Esterase, Urine MODERATE (A) Negative    Microscopic Examination YES     Urine Type NotGiven     Urine Reflex to Culture Yes    Pregnancy, Urine   Result Value Ref Range    HCG(Urine) Pregnancy Test Negative Detects HCG level >20 MIU/mL   Microscopic Urinalysis   Result Value Ref Range    WBC, UA >100 (A) 0 - 5 /HPF    RBC, UA 5-10 (A) 0 - 4 /HPF    Epithelial Cells, UA 21-50 (A) 0 - 5 /HPF    Bacteria, UA 3+ (A) None Seen /HPF    Amorphous, UA 1+ /HPF    Trichomonas, UA Present (A) None Seen /HPF       Treatment in the department:  Patient received   Medications   lidocaine PF 1 % injection (has no administration in time range)   doxycycline monohydrate (MONODOX) capsule 100 mg (has no administration in time range)   metroNIDAZOLE (FLAGYL) tablet 2,000 mg (has no administration in time range)   cefTRIAXone (ROCEPHIN) injection 500 mg (500 mg Intramuscular Given 1/22/21 0252)       Medical decision making:  Patient presents to ED with concerns for STD and UTI.

## 2021-01-23 LAB
ORGANISM: ABNORMAL
URINE CULTURE, ROUTINE: ABNORMAL
URINE CULTURE, ROUTINE: ABNORMAL

## 2021-01-26 LAB
C TRACH DNA GENITAL QL NAA+PROBE: NEGATIVE
N. GONORRHOEAE DNA: POSITIVE

## 2021-04-13 ENCOUNTER — HOSPITAL ENCOUNTER (EMERGENCY)
Age: 21
Discharge: HOME | End: 2021-04-13
Payer: MEDICAID

## 2021-04-13 VITALS
BODY MASS INDEX: 48.36 KG/M2 | WEIGHT: 273 LBS | RESPIRATION RATE: 18 BRPM | SYSTOLIC BLOOD PRESSURE: 135 MMHG | HEART RATE: 91 BPM | TEMPERATURE: 98.1 F | OXYGEN SATURATION: 97 % | DIASTOLIC BLOOD PRESSURE: 82 MMHG

## 2021-04-13 DIAGNOSIS — N76.4 LABIAL ABSCESS: Primary | ICD-10-CM

## 2021-04-13 DIAGNOSIS — N73.9 ABSCESS OF FEMALE PELVIS: ICD-10-CM

## 2021-04-13 PROCEDURE — 10060 I&D ABSCESS SIMPLE/SINGLE: CPT

## 2021-04-13 PROCEDURE — 56405 I&D VULVA/PERINEAL ABSCESS: CPT

## 2021-04-13 PROCEDURE — 99283 EMERGENCY DEPT VISIT LOW MDM: CPT

## 2021-04-13 ASSESSMENT — PAIN DESCRIPTION - PAIN TYPE: TYPE: ACUTE PAIN

## 2021-04-13 ASSESSMENT — PAIN SCALES - GENERAL: PAINLEVEL_OUTOF10: 10

## 2021-04-13 NOTE — ED PROVIDER NOTES
Evaluated by Advanced Practice Provider    [address] [facility]  ED    CHIEF COMPLAINT  Abscess (abscess in groin area, wanted a second opinion, states given abx but wanted to make sure should be taking them )    [address] is a 23 y.o. female who presents to the ED with complaints of abscess to the groin. She had an abscess for about 4-5 days and it has gone down, popped on its own. Has developed a second one and can not get it to pop and is having excruciating pain. She went to [facility] night, states they really didn't even look at it, the one to the labia. They prescribed her stuff and she went and got it but is unsure about taking it. Wanted someone to actually look at it and make sure it is nothing serious. Has not had them in the groin area in the past, had one between her boobs and got it lanced. She does shave the groin area. Denies fevers, sweats, chills. Denies nausea, vomiting. THe first one opened up right before she was seen yesterday. THe second one developed about 3 days ago, 1 day after the first abscess appeared. The patient is currently rating their pain as 10/10 and describes it as an throbbing type of pain. Treatments tried prior to arrival in the ED: above but has not started the antibiotic. The patient arrived to the ED via private car. Nursing notes reviewed. Past Medical History:   Diagnosis Date    Attention deficit disorder with hyperactivity(314.01)     JEANNE (generalized anxiety disorder)     GERD (gastroesophageal reflux disease)     Gonorrhea 10/18/2020    Hypothyroid     PTSD (post-traumatic stress disorder)     Severe recurrent major depression with psychotic features ([facility] [address]      History reviewed. No pertinent surgical history. History reviewed. No pertinent family history.   Social History     Socioeconomic History    Marital status: Single     Spouse name: Not on file    Number of children: Not on file    Years of education: Not on file    Highest education level: Not on file   Occupational History    Not on file   Social Needs    Financial resource strain: Not on file    Food insecurity     Worry: Not on file     Inability: Not on file    Transportation needs     Medical: Not on file     Non-medical: Not on file   Tobacco Use    Smoking status: Current Every Day Smoker     Packs/day: 1.00    Smokeless tobacco: Never Used   Substance and Sexual Activity    Alcohol use: No     Alcohol/week: 0.0 standard drinks    Drug use: No    Sexual activity: Yes     Partners: Male   Lifestyle    Physical activity     Days per week: Not on file     Minutes per session: Not on file    Stress: Not on file   Relationships    Social connections     Talks on phone: Not on file     Gets together: Not on file     Attends Congregational service: Not on file     Active member of club or organization: Not on file     Attends meetings of clubs or organizations: Not on file     Relationship status: Not on file    Intimate partner violence     Fear of current or ex partner: Not on file     Emotionally abused: Not on file     Physically abused: Not on file     Forced sexual activity: Not on file   Other Topics Concern    Not on file   Social History Narrative    Not on file     No current facility-administered medications for this encounter. No current outpatient medications on file. Allergies   Allergen Reactions    Augmentin [Amoxicillin-Pot Clavulanate] Other (See Comments)     unknown       REVIEW OF SYSTEMS    10 systems reviewed, pertinent positives per HPI otherwise noted to be negative    PHYSICAL EXAM  Vitals:    04/13/21 1809   BP: 135/82   Pulse: 91   Resp: 18   Temp: 98.1 °F (36.7 °C)   SpO2: 97%       GENERAL: Patient is well-developed, morbidly obese. Awake and alert. Cooperative. Resting in bed. No apparent distress. HEENT:  Normocephalic, atraumatic. Conjunctiva appear normal. Sclera is non-icteric.   External ears are normal. Mucous membranes moist.  NECK: Supple with normal ROM. Trachea midline  LUNGS: Equal and symmetric chest rise. Breathing is unlabored. Speaking comfortably in full sentences. CADIOVASCULAR:  Regular rate and rhythm. GI: Non-distended. EXTREMITIES: Normal ROM, no edema, no tenderness, or deformity. Distal pulses palpable. No gross deformities or trauma noted. Moving all extremities equally and appropriately. BACK: No tenderness. SKIN: Warm, dry. Suprapubic area on the right side with small area of erythema and induration with a very small open area in the middle. To the left labia majora there is an erythematous area with induration to palpation, no fluctuance and no open areas. No streaking erythema or lymphangitic patterns observed. PSYCHIATRIC: Mood and affect appropriate. Speech is clear and articulate. NEUROLOGIC: Alert and oriented. No focal motor or sensory deficits. PROCEDURES  I & D: Right suprapubic area: The procedures, hazards, and the alternatives were discussed. Patient had full decisional capacity, voiced understanding and gave verbal consent to proceed. 2% plain lidocaine mixed with 0.5% plain Marcaine was injected at the edges of the abscess. Once local anesthesia was obtained, affected area was prepped and draped in sterile fashion. A cruciate incision was made in the center using #11 blade scalpel to allow for continued drainage. Purulent material was expressed. The area was cleansed and dressing was placed over the wound. I & D: Left labia majora  The procedures, hazards, and the alternatives were discussed. Patient had full decisional capacity, voiced understanding and gave verbal consent to proceed. 2% plain lidocaine mixed with 0.5% plain Marcaine was injected at the edges of the abscess. Once local anesthesia was obtained, affected area was prepped and draped in sterile fashion.   A cruciate incision was made in the center using #11 blade scalpel to allow for continued drainage. Purulent material was expressed. The area was cleansed and dressing was placed over the wound. LABS  No results found for this visit on 04/13/21. RADIOLOGY    No results found. ED COURSE/MDM  Patient seen and evaluated. Old records reviewed. I have evaluated this patient. My supervising physician was available for consultation. Delta Díaz presented to the ED today with above noted complaints. Physical exam does reveal a left labia majora and right suprapubic erythematous area consistent with abscess. These are without fluctuance, there is induration upon palpation. I&D per above procedure note. The patient is currently afebrile, without tachycardia, and BP is normotensive. The patient has been prescribed clindamycin but not started it, she was advised to start this as prescribed. The patient received first dose in the ED. The patient was instructed to monitor the area and if it begins to worsen to return to the ED. The patient was also instructed to follow up with either PCP or ED in 2 days for a wound re-check. The patient was instructed on wound care, signs and symptoms of increasing infection, and when to return to a health care provider. At this point I do not feel the patient requires further work up and it is reasonable to discharge the patient. Please refer to AVS for further details regarding discharge instructions. A discussion was had with the patient regarding diagnosis, treatment/ plan of care, and follow up. All questions were answered. Patient will follow up as directed for further evaluation/treatment. The patient was given strict return precautions as we discussed symptoms that would necessitate return to the ED. Patient will return to ED for new/worsening symptoms. The patient verbalized their understanding and agreement with the above plan. Patient was sent home with a prescription for below medication/s.   I did [place] patient on appropriate use of these medication. There are no discharge medications for this patient. I estimate there is LOW risk for CELLULITIS, COMPARTMENT SYNDROME, NECROTIZING FASCIITIS, TENDON OR NEUROVASCULAR INJURY, or FOREIGN BODY, thus I consider the discharge disposition reasonable. Also, there is no evidence or peritonitis, sepsis, or toxicity. Dedra Tabor and I have discussed the diagnosis and risks, and we agree with discharging home to follow-up with their primary doctor. We also discussed returning to the Emergency Department immediately if new or worsening symptoms occur. We have discussed the symptoms which are most concerning (e.g., changing or worsening pain, fever, numbness, weakness, cool or painful digits) that necessitate immediate return. CLINICAL IMPRESSION    1. Labial abscess    2. Abscess of female pelvis           Discharge Vitals:  Blood pressure 135/82, pulse 91, temperature 98.1 °F (36.7 °C), temperature source Oral, resp. rate 18, weight 273 lb (123.8 kg), last menstrual period 03/19/2021, SpO2 97 %. [facility]  [phone]  Call in 1 day  For further evaluation    [facility]  ED  [address]  Go to   If symptoms worsen      DISPOSITION  Patient was discharged to home in good condition. Comment: Please note this report has been produced using speech recognition software and may contain errors related to that system including errors in grammar, punctuation, and spelling, as well as words and phrases that may be inappropriate. If there are any questions or concerns please feel free to contact the dictating provider for clarification.             LAYLA Garcia - CNP  04/14/21 0104

## 2021-09-29 DIAGNOSIS — H91.90 HEARING LOSS, UNSPECIFIED HEARING LOSS TYPE, UNSPECIFIED LATERALITY: Primary | ICD-10-CM

## 2021-09-30 ENCOUNTER — TELEPHONE (OUTPATIENT)
Dept: ENT CLINIC | Age: 21
End: 2021-09-30

## 2021-09-30 NOTE — TELEPHONE ENCOUNTER
Called Mom's phone and left a message to see if Angela Smith would be able to come in tomorrow at 10 for hearing test

## 2021-10-05 NOTE — PROGRESS NOTES
Aly Hatch   2000, 24 y.o. female   <[id number]>       Referring Provider: Elliot White MD  Referral Type: In an order in [employer]    Reason for Visit: Evaluation of suspected change in hearing, tinnitus, or balance. ADULT AUDIOLOGIC EVALUATION      Aly Hatch is a 24 y.o. female seen today, 10/6/2021 , for an initial audiologic evaluation. Patient was seen by Elliot White MD following today's evaluation. AUDIOLOGIC AND OTHER PERTINENT MEDICAL HISTORY:      Aly Hatch noted otalgia, aural fullness, tinnitus and history of ear surgery. Patient reports a long history of chronic ear infections. She notes she has had 3 sets of PE Tubes. Today she reports fullness and pain bilaterally. She also notes a heartbeat sound in the left ear. Patient reports bilateral tinnitus that is intermittent in nature. Aly Hatch denied otorrhea, dizziness, imbalance, history of falls, history of significant noise exposure, history of ear surgery and family history of hearing loss. Date: 10/6/2021     IMPRESSIONS:      AU: Mild sloping to Moderate rising to Mild MHL, Excellent WRS, Type B tymp    Hearing loss consistent with bilateral  Mixed hearing loss. Hearing loss significant enough to create hearing difficulty in most listening situations. Discussed hearing loss, tinnitus and hearing aids with patient. Patient to follow medical recommendations per Elliot White MD .    ASSESSMENT AND FINDINGS:     Otoscopy revealed: non-occluding debris in  ear canals bilaterally    RIGHT EAR:  Hearing Sensitivity: Mild to Moderate to Mild Mixed hearing loss  Speech Recognition Threshold: 50 dB HL  Word Recognition:Excellent (96%), based on NU-6 25-word list at 85m dBHL using recorded speech stimuli. Tympanometry: Flat, no peak pressure or compliance, Type B tympanogram, with typical ear canal volume, consistent with middle ear fluid.   Acoustic Reflexes: Ipsilateral: Could not maintain seal. Contralateral: Could not maintain seal.    LEFT EAR:  Hearing Sensitivity: Mild to Moderate to Mild Mixed hearing loss  Speech Recognition Threshold: 55 dB HL  Word Recognition:Excellent (100%), based on NU-6 25-word list at 90m dBHL using recorded speech stimuli. Tympanometry: Flat, no peak pressure or compliance, Type B tympanogram, with typical ear canal volume, consistent with middle ear fluid. Acoustic Reflexes: Ipsilateral: Could not maintain seal. Contralateral: Could not maintain seal.    Reliability: Good  Transducer: HF Headphones    See scanned audiogram dated 10/6/2021  for results. PATIENT EDUCATION:     The following items were discussed with the patient:   - Good Communication Strategies  - Hearing Loss and Hearing Aids  - Tinnitus Management Strategies      Educational information was shared in the After Visit Summary. RECOMMENDATIONS:                                                                                                                                                                                                                                                          The following items are recommended based on patient report and results from today's appointment:   - Continue medical follow-up with Torsten Vazquez MD.   - Retest hearing as medically indicated and/or sooner if a change in hearing is noted. - If desired, schedule a Hearing Aid Evaluation (HAE) appointment to discuss hearing aid options. - Utilize \"Good Communication Strategies\" as discussed to assist in speech understanding with communication partners. - Maintain a sound enriched environment to assist in the management of tinnitus symptoms.        Malena Harmon  Audiologist    Chart CC'd to: Torsten Vazquez MD      Degree of   Hearing Sensitivity dB Range   Within Normal Limits (WNL) 0 - 20   Mild 20 - 40   Moderate 40 - 55   Moderately-Severe 55 - 70   Severe 70 - 90   Profound 90 +

## 2021-10-06 ENCOUNTER — PROCEDURE VISIT (OUTPATIENT)
Dept: AUDIOLOGY | Age: 21
End: 2021-10-06
Payer: MEDICAID

## 2021-10-06 ENCOUNTER — OFFICE VISIT (OUTPATIENT)
Dept: ENT CLINIC | Age: 21
End: 2021-10-06
Payer: MEDICAID

## 2021-10-06 VITALS
HEART RATE: 92 BPM | WEIGHT: 293 LBS | BODY MASS INDEX: 57.52 KG/M2 | SYSTOLIC BLOOD PRESSURE: 151 MMHG | TEMPERATURE: 97.9 F | DIASTOLIC BLOOD PRESSURE: 88 MMHG | HEIGHT: 60 IN

## 2021-10-06 DIAGNOSIS — H92.13 CHRONIC OTORRHEA OF BOTH EARS: ICD-10-CM

## 2021-10-06 DIAGNOSIS — H93.13 TINNITUS OF BOTH EARS: ICD-10-CM

## 2021-10-06 DIAGNOSIS — H90.6 MIXED CONDUCTIVE AND SENSORINEURAL HEARING LOSS OF BOTH EARS: Primary | ICD-10-CM

## 2021-10-06 DIAGNOSIS — Z41.9 SURGERY, ELECTIVE: Primary | ICD-10-CM

## 2021-10-06 DIAGNOSIS — H92.03 OTALGIA OF BOTH EARS: ICD-10-CM

## 2021-10-06 DIAGNOSIS — Z96.22 RETAINED MYRINGOTOMY TUBE IN RIGHT EAR: ICD-10-CM

## 2021-10-06 PROCEDURE — 92567 TYMPANOMETRY: CPT | Performed by: AUDIOLOGIST

## 2021-10-06 PROCEDURE — 4004F PT TOBACCO SCREEN RCVD TLK: CPT | Performed by: OTOLARYNGOLOGY

## 2021-10-06 PROCEDURE — 99204 OFFICE O/P NEW MOD 45 MIN: CPT | Performed by: OTOLARYNGOLOGY

## 2021-10-06 PROCEDURE — G8427 DOCREV CUR MEDS BY ELIG CLIN: HCPCS | Performed by: OTOLARYNGOLOGY

## 2021-10-06 PROCEDURE — 92557 COMPREHENSIVE HEARING TEST: CPT | Performed by: AUDIOLOGIST

## 2021-10-06 PROCEDURE — G8484 FLU IMMUNIZE NO ADMIN: HCPCS | Performed by: OTOLARYNGOLOGY

## 2021-10-06 PROCEDURE — G8417 CALC BMI ABV UP PARAM F/U: HCPCS | Performed by: OTOLARYNGOLOGY

## 2021-10-06 RX ORDER — SULFAMETHOXAZOLE AND TRIMETHOPRIM 800; 160 MG/1; MG/1
1 TABLET ORAL 2 TIMES DAILY
Qty: 14 TABLET | Refills: 0 | Status: SHIPPED | OUTPATIENT
Start: 2021-10-06 | End: 2021-10-13

## 2021-10-06 NOTE — Clinical Note
Dr. Lidia Head,    Please see note from this patient's audiogram from today. Please let me know if there is anything further you need.         Malena Alexander  Audiologist

## 2021-10-06 NOTE — PROGRESS NOTES
[address] [facility]- HEAD & NECK SURGERY  Follow up      Patient Name: Miguel Recinos Record Number:  <[id number]>  Primary Care Physician:  No primary care provider on file. Date of Consultation: 10/6/2021    Chief Complaint: Ear issues      Interval History  Patient is presenting for evaluation of ear issues. She said that she has had 3 different sets of ear tubes. She is also had an adenotonsillectomy. She said that her third set was when she was 5years old. Shortly after that she stopped following up with ear nose and throat. She said that she has had intermittent antibiotics for her ears. She said sometimes that they hurt. She has drainage from both ears. Her biggest concern is that she does not feel as though she can hear well. She is never had any other ear procedures other than the tubes. She does not think she has any other significant health concerns, but says that she has not been established with a primary care doctor in quite some time. REVIEW OF SYSTEMS  As above    PHYSICAL EXAM  GENERAL: No Acute Distress, Alert and Oriented, no Hoarseness, strong voice  EYES: EOMI, Anti-icteric  HENT:   Head: Normocephalic and atraumatic. Face:  Symmetric, facial nerve intact, no sinus tenderness   ears: See below  Mouth/Oral Cavity:  normal lips, Uvula is midline  Oropharynx/Larynx:  normal oropharynx, absent tonsils  Nose:Normal external nasal appearance. NECK: Normal range of motion, no thyromegaly, trachea is midline, no lymphadenopathy, no neck masses, no crepitus  CHEST: Normal respiratory effort, no retractions, breathing comfortably  SKIN: No rashes, normal appearing skin, no evidence of skin lesions/tumors  Neuro:  cranial nerve II-XII intact; normal gait  Cardio:  no edema      PROCEDURE  Bilateral ear exam and debridement  The right ears visualized binoculars scope.   There was a papillomatous appearing lesion partially obstructing the lateral external auditory canal.  There was purulent drainage in the canal that evacuated the [facility] suction. This revealed a patent ear tube with surrounding granulation tissue. There was purulent drainage coming from the middle ear    On the left side the patient again had some purulent drainage that I removed. Tympanic membrane was mostly covered and some granulation tissue. I did not see an obvious ear tube or perforation. Patient had an audiogram today that shows bilateral moderate mixed hearing loss        ASSESSMENT/PLAN  1. Mixed conductive and sensorineural hearing loss of both earsThis patient has a retained ear tube on the right side. Given the granulation tissue on the left side, suspect there may be one hiding on the side as well. I feel as though at this point her ear tubes need to be removed. She almost certainly has a biofilm and may be having otorrhea secondary to the retained ear tubes. They have been in place for over a decade. I think we need to do this in the operating room. If there is not a retained tube on the left side it complicates matters a little bit. I would like to examine the ear and remove any retained tube. If there is not it retained tube I would likely need to do at least a diagnostic myringotomy to look for one. In addition the patient does have a papillomatous appearing lesion of the lateral canal on the right that I would like to remove. I recommend that we go to the operating room remove the papillomatous lesion of the lateral canal.  Remove the ear tube on the right with a patch myringoplasty and examine the left ear tube and decide whether or not there is a retained tube on that side as well. Patient understands the risk of this includes need for another set of ear tubes. She is agreed to proceed. 2. Chronic otorrhea of both ears  The meantime I am treating her with Cortisporin drops as well as oral antibiotics.     3. Retained myringotomy tube in right ear  As above    4. External auditory canal lesion-appears papillomas, getting larger and obstructing the ear canal.  Plan on removal in the operating room. I have performed a head and neck physical exam personally or was physically present during the key or critical portions of the service. This note was generated completely or in part utilizing Dragon dictation speech recognition software. Occasionally, words are mistranscribed and despite editing, the text may contain inaccuracies due to incorrect word recognition. If further clarification is needed please contact the office at [phone].

## 2021-10-06 NOTE — PATIENT INSTRUCTIONS
Medicaid Hearing Aid Providers [facility]    These offices are known to work with some Medicaid plans for hearing aids. They may not work with ALL Medicaid plans. You should contact your insurance provider to find an audiologist for hearing aids near you. Main Office  [address] [facility]    Voice: [phone]  Video Phone: [phone]  Fax [phone]  Hours:  8:30-5:00  Monday through Friday [facility] Office  [address] [facility] [facility], [address]    Voice [phone]  Fax [phone]    Hours: 8:30-noon and 1:00-5:00  Monday through Friday   [facility] OF [address] [facility]. [address]    Voice [phone]  Fax [phone]    Hours:  8:30-noon and 1:00-5:00  Monday through Friday         [address]  [address] [place] [address]  Phone: [phone]  Hours:  8:00-5:00  Monday through Friday [place] [address] [facility] [address]  [place] [address] [employer]   Phone: [phone]  Hours:  8:00-5:00  Monday through Friday     Hearing Loss: Care Instructions  Your Care Instructions      Hearing loss is a sudden or slow decrease in how well you hear. It can range from mild to profound. Permanent hearing loss can occur with aging, and it can happen when you are exposed long-term to loud noise. Examples include listening to loud music, riding motorcycles, or being around other loud machines. Hearing loss can affect your work and home life. It can make you feel lonely or depressed. You may feel that you have lost your independence. But hearing aids and other devices can help you hear better and feel connected to others. Follow-up care is a key part of your treatment and safety. Be sure to make and go to all appointments, and call your doctor if you are having problems. It's also a good idea to know your test results and keep a list of the medicines you take. How can you care for yourself at home? · Avoid loud noises whenever possible. This helps keep your hearing from getting worse. Always wear hearing protection around loud noises. · If appropriate, wear hearing aid(s) as directed. It is recommended that hearing aids are worn during all waking hours to keep your brain active and give it access to the sounds it is missing. · If you are beginning your process with hearing aid(s), schedule a \"Hearing Aid Evaluation\" with an audiologist to discuss your lifestyle, features of hearing aid technology, and styles of hearing aids available. It is recommended that you contact your insurance company to determine if you have a hearing aid benefit, as this may dictate who you can see for these services. · Have hearing tests as your doctor suggests. They can show whether your hearing has changed. Your hearing aid may need to be adjusted. · Use other assistive devices as needed. These may include:  ? Telephone amplifiers and hearing aids that can connect to a television, stereo, radio, or microphone. ? Devices that use lights or vibrations. These alert you to the doorbell, a ringing telephone, or a baby monitor. ? Television closed-captioning. This shows the words at the bottom of the screen. Most new TVs can do this. ? TTY (text telephone). This lets you type messages back and forth on the telephone instead of talking or listening. These devices are also called TDD. When messages are typed on the keyboard, they are sent over the phone line to a receiving TTY. The message is shown on a monitor. · Use pagers, fax machines, text, and email if it is hard for you to communicate by telephone. · Try to learn a listening technique called speech-reading. It is not lip-reading. You pay attention to people's gestures, expressions, posture, and tone of voice.  These clues can help you understand what a person is saying. Face the person you are talking to, and have him or her face you. Make sure the lighting is good. You need to see the other person's face clearly. · Think about counseling if you need help to adjust to your hearing loss. When should you call for help? Watch closely for changes in your health, and be sure to contact your doctor if:    · You think your hearing is getting worse. · You have new symptoms, such as dizziness or nausea.

## 2021-10-19 NOTE — PROGRESS NOTES
C-Difficile admission screening and protocol:     * Admitted with diarrhea? no     *Prior history of C-Diff. In last 3 months? no     *Antibiotic use in the past 6-8 weeks? Currently on. Will finish round. *Prior hospitalization or nursing home in the last month?   no

## 2021-10-19 NOTE — PROGRESS NOTES
Preoperative Screening for Elective Surgery/Invasive Procedures While COVID-19 present in the community     Have you tested positive or have been told to self-isolate for COVID-19 like symptoms within the past 28 days? no   Do you currently have any of the following symptoms? no  o Fever >100.0 F or 99.9 F in immunocompromised patients? o New onset cough, shortness of breath or difficulty breathing?  o New onset sore throat, myalgia (muscle aches and pains), headache, loss of taste/smell or diarrhea?  Have you had a potential exposure to COVID-19 within the past 14 days by:  o Close contact with a confirmed case? no  o Close contact with a healthcare worker,  or essential infrastructure worker (grocery store, [employer], gas station, public utilities or transportation)?no  o Do you reside in a congregate setting such as; skilled nursing facility, adult home, correctional facility, homeless shelter or other institutional setting? no  o Have you had recent travel to a known COVID-19 hotspot? no    Indicate if the patient has a positive screen by answering yes to one or more of the above questions. Patients who test positive or screen positive prior to surgery or on the day of surgery should be evaluated in conjunction with the surgeon/proceduralist/anesthesiologist to determine the urgency of the procedure.

## 2021-10-19 NOTE — PROGRESS NOTES
Pt having a difficult time finding location to obtain preop h&p. States she'll keep trying. Instructed to bring DOS when she does obtain it. Will follow up in a few days to make sure she found a location.

## 2021-10-19 NOTE — PROGRESS NOTES
[address] [facility] time_____1150_______        Surgery time_____1350_______    Take the following medications with a sip of water: per md office    Do not eat or drink anything after 12:00 midnight prior to your surgery. This includes water chewing gum, mints and ice chips. You may brush your teeth and gargle the morning of your surgery, but do not swallow the water     Please see your family doctor/pediatrician for a history and physical and/or concerning medications. Bring any test results/reports from your physicians office. If you are under the care of a heart doctor or specialist doctor, please be aware that you may be asked to them for clearance    You may be asked to stop blood thinners such as Coumadin, Plavix, Fragmin, Lovenox, etc., or any anti-inflammatories such as:  Aspirin, Ibuprofen, Advil, Naproxen prior to your surgery. We also ask that you stop any OTC medications such as fish oil, vitamin E, glucosamine, garlic, Multivitamins, COQ 10, etc.    We ask that you do not smoke 24 hours prior to surgery  We ask that you do not  drink any alcoholic beverages 24 hours prior to surgery     You must make arrangements for a responsible adult to take you home after your surgery. For your safety you will not be allowed to leave alone or drive yourself home. Your surgery will be cancelled if you do not have a ride home. Also for your safety, it is strongly suggested that someone stay with you the first 24 hours after your surgery. A parent or legal guardian must accompany a child scheduled for surgery and plan to stay at the hospital until the child is discharged. Please do not bring other children with you. For your comfort, please wear simple loose fitting clothing to the hospital.  Please do not bring valuables.     Do not wear any make-up or nail polish on your fingers or toes      For your safety, please do not wear any jewelry or body piercing's on the day of surgery. All jewelry must be removed. If you have dentures, they will be removed before going to operating room. For your convenience, we will provide you with a container. If you wear contact lenses or glasses, they will be removed, please bring a case for them. If you have a living will and a durable power of  for healthcare, please bring in a copy. As part of our patient safety program to minimize surgical site infections, we ask you to do the following:    · Please notify your surgeon if you develop any illness between         now and the  day of your surgery. · This includes a cough, cold, fever, sore throat, nausea,         or vomiting, and diarrhea, etc.  ·  Please notify your surgeon if you experience dizziness, shortness         of breath or blurred vision between now and the time of your surgery. Do not shave your operative site 96 hours prior to surgery. For face and neck surgery, men may use an electric razor 48 hours   prior to surgery. You may shower the night before surgery or the morning of   your surgery with an antibacterial soap. You will need to bring a photo ID and insurance card    [facility] has an onsite pharmacy, would you like to utilize our pharmacy     If you will be staying overnight and use a C-pap machine, please bring   your C-pap to hospital     Our goal is to provide you with excellent care, therefore, visitors will be limited to two(2) in the room at a time so that we may focus on providing this care for you. Please contact pre-admission testing if you have any further questions. [facility] phone number:  [phone] [facility] fax number:  [phone]  Please note these are generalized instructions for all surgical cases, you may be provided with more specific instructions according to your surgery.

## 2021-10-20 ENCOUNTER — HOSPITAL ENCOUNTER (EMERGENCY)
Age: 21
Discharge: LEFT BEFORE DISCHARGE | End: 2021-10-20
Payer: MEDICAID

## 2021-10-20 ENCOUNTER — OFFICE VISIT (OUTPATIENT)
Dept: PRIMARY CARE CLINIC | Age: 21
End: 2021-10-20
Payer: MEDICAID

## 2021-10-20 VITALS
WEIGHT: 293 LBS | HEIGHT: 60 IN | SYSTOLIC BLOOD PRESSURE: 135 MMHG | DIASTOLIC BLOOD PRESSURE: 77 MMHG | HEART RATE: 82 BPM | BODY MASS INDEX: 57.52 KG/M2

## 2021-10-20 DIAGNOSIS — F17.200 CURRENT SMOKER: ICD-10-CM

## 2021-10-20 DIAGNOSIS — E66.01 MORBID OBESITY (HCC): ICD-10-CM

## 2021-10-20 DIAGNOSIS — Z00.00 ANNUAL PHYSICAL EXAM: ICD-10-CM

## 2021-10-20 DIAGNOSIS — Z76.89 ENCOUNTER FOR ASSESSMENT OF STD EXPOSURE: ICD-10-CM

## 2021-10-20 DIAGNOSIS — R06.83 SNORING: ICD-10-CM

## 2021-10-20 DIAGNOSIS — E03.9 HYPOTHYROIDISM, UNSPECIFIED TYPE: ICD-10-CM

## 2021-10-20 DIAGNOSIS — H66.93 RECURRENT AOM (ACUTE OTITIS MEDIA) OF BOTH EARS: ICD-10-CM

## 2021-10-20 DIAGNOSIS — Z01.818 PREOPERATIVE CLEARANCE: Primary | ICD-10-CM

## 2021-10-20 DIAGNOSIS — Z12.4 CERVICAL CANCER SCREENING: ICD-10-CM

## 2021-10-20 DIAGNOSIS — R30.0 DYSURIA: ICD-10-CM

## 2021-10-20 DIAGNOSIS — K21.9 GASTROESOPHAGEAL REFLUX DISEASE, UNSPECIFIED WHETHER ESOPHAGITIS PRESENT: ICD-10-CM

## 2021-10-20 DIAGNOSIS — Z01.818 PREOPERATIVE CLEARANCE: ICD-10-CM

## 2021-10-20 PROBLEM — A41.9 SEPSIS (HCC): Status: RESOLVED | Noted: 2020-11-17 | Resolved: 2021-10-20

## 2021-10-20 PROBLEM — N73.9 PELVIC INFLAMMATORY DISEASE: Status: RESOLVED | Noted: 2020-11-17 | Resolved: 2021-10-20

## 2021-10-20 LAB
A/G RATIO: 1.4 (ref 1.1–2.2)
ALBUMIN SERPL-MCNC: 4.3 G/DL (ref 3.4–5)
ALP BLD-CCNC: 72 U/L (ref 40–129)
ALT SERPL-CCNC: 13 U/L (ref 10–40)
ANION GAP SERPL CALCULATED.3IONS-SCNC: 12 MMOL/L (ref 3–16)
AST SERPL-CCNC: 10 U/L (ref 15–37)
BASOPHILS ABSOLUTE: 0 K/UL (ref 0–0.2)
BASOPHILS RELATIVE PERCENT: 0.3 %
BILIRUB SERPL-MCNC: <0.2 MG/DL (ref 0–1)
BILIRUBIN URINE: NEGATIVE
BLOOD, URINE: NEGATIVE
BUN BLDV-MCNC: 14 MG/DL (ref 7–20)
CALCIUM SERPL-MCNC: 10.1 MG/DL (ref 8.3–10.6)
CHLORIDE BLD-SCNC: 100 MMOL/L (ref 99–110)
CHOLESTEROL, TOTAL: 166 MG/DL (ref 0–199)
CLARITY: ABNORMAL
CO2: 25 MMOL/L (ref 21–32)
COLOR: YELLOW
COMMENT UA: ABNORMAL
CREAT SERPL-MCNC: 0.5 MG/DL (ref 0.6–1.1)
EOSINOPHILS ABSOLUTE: 0.2 K/UL (ref 0–0.6)
EOSINOPHILS RELATIVE PERCENT: 1.6 %
EPITHELIAL CELLS, UA: 16 /HPF (ref 0–5)
GFR AFRICAN AMERICAN: >60
GFR NON-AFRICAN AMERICAN: >60
GLOBULIN: 3 G/DL
GLUCOSE BLD-MCNC: 79 MG/DL (ref 70–99)
GLUCOSE URINE: NEGATIVE MG/DL
HCG(URINE) PREGNANCY TEST: NEGATIVE
HCT VFR BLD CALC: 40.9 % (ref 36–48)
HDLC SERPL-MCNC: 36 MG/DL (ref 40–60)
HEMOGLOBIN: 13.4 G/DL (ref 12–16)
HEPATITIS C ANTIBODY INTERPRETATION: NORMAL
HYALINE CASTS: 1 /LPF (ref 0–8)
KETONES, URINE: NEGATIVE MG/DL
LDL CHOLESTEROL CALCULATED: 97 MG/DL
LEUKOCYTE ESTERASE, URINE: ABNORMAL
LYMPHOCYTES ABSOLUTE: 3.5 K/UL (ref 1–5.1)
LYMPHOCYTES RELATIVE PERCENT: 33.2 %
MCH RBC QN AUTO: 25.8 PG (ref 26–34)
MCHC RBC AUTO-ENTMCNC: 32.7 G/DL (ref 31–36)
MCV RBC AUTO: 79 FL (ref 80–100)
MICROSCOPIC EXAMINATION: YES
MONOCYTES ABSOLUTE: 0.5 K/UL (ref 0–1.3)
MONOCYTES RELATIVE PERCENT: 4.8 %
NEUTROPHILS ABSOLUTE: 6.3 K/UL (ref 1.7–7.7)
NEUTROPHILS RELATIVE PERCENT: 60.1 %
NITRITE, URINE: NEGATIVE
PDW BLD-RTO: 15.1 % (ref 12.4–15.4)
PH UA: 6 (ref 5–8)
PLATELET # BLD: 420 K/UL (ref 135–450)
PMV BLD AUTO: 8.6 FL (ref 5–10.5)
POTASSIUM SERPL-SCNC: 4.9 MMOL/L (ref 3.5–5.1)
PROTEIN UA: NEGATIVE MG/DL
RBC # BLD: 5.18 M/UL (ref 4–5.2)
RBC UA: ABNORMAL /HPF (ref 0–4)
SODIUM BLD-SCNC: 137 MMOL/L (ref 136–145)
SPECIFIC GRAVITY UA: 1.03 (ref 1–1.03)
SPECIMEN TYPE: ABNORMAL
TOTAL PROTEIN: 7.3 G/DL (ref 6.4–8.2)
TRICHOMONAS VAGINALIS SCREEN: POSITIVE
TRIGL SERPL-MCNC: 167 MG/DL (ref 0–150)
TSH REFLEX: 2.41 UIU/ML (ref 0.27–4.2)
URINE TYPE: ABNORMAL
UROBILINOGEN, URINE: 0.2 E.U./DL
VLDLC SERPL CALC-MCNC: 33 MG/DL
WBC # BLD: 10.4 K/UL (ref 4–11)
WBC UA: 18 /HPF (ref 0–5)

## 2021-10-20 PROCEDURE — 90674 CCIIV4 VAC NO PRSV 0.5 ML IM: CPT | Performed by: STUDENT IN AN ORGANIZED HEALTH CARE EDUCATION/TRAINING PROGRAM

## 2021-10-20 PROCEDURE — 90732 PPSV23 VACC 2 YRS+ SUBQ/IM: CPT | Performed by: STUDENT IN AN ORGANIZED HEALTH CARE EDUCATION/TRAINING PROGRAM

## 2021-10-20 PROCEDURE — 93000 ELECTROCARDIOGRAM COMPLETE: CPT | Performed by: STUDENT IN AN ORGANIZED HEALTH CARE EDUCATION/TRAINING PROGRAM

## 2021-10-20 PROCEDURE — 90472 IMMUNIZATION ADMIN EACH ADD: CPT | Performed by: STUDENT IN AN ORGANIZED HEALTH CARE EDUCATION/TRAINING PROGRAM

## 2021-10-20 PROCEDURE — 99242 OFF/OP CONSLTJ NEW/EST SF 20: CPT | Performed by: STUDENT IN AN ORGANIZED HEALTH CARE EDUCATION/TRAINING PROGRAM

## 2021-10-20 PROCEDURE — 90471 IMMUNIZATION ADMIN: CPT | Performed by: STUDENT IN AN ORGANIZED HEALTH CARE EDUCATION/TRAINING PROGRAM

## 2021-10-20 RX ORDER — LEVOTHYROXINE SODIUM 0.03 MG/1
25 TABLET ORAL
COMMUNITY
End: 2021-10-20 | Stop reason: ALTCHOICE

## 2021-10-20 RX ORDER — PSEUDOEPHEDRINE HCL 60 MG/1
60 TABLET ORAL EVERY 8 HOURS PRN
COMMUNITY
Start: 2021-08-31 | End: 2021-10-20 | Stop reason: ALTCHOICE

## 2021-10-20 RX ORDER — ALBUTEROL SULFATE 90 UG/1
AEROSOL, METERED RESPIRATORY (INHALATION)
COMMUNITY
Start: 2021-09-01 | End: 2021-10-20 | Stop reason: ALTCHOICE

## 2021-10-20 ASSESSMENT — ENCOUNTER SYMPTOMS
COUGH: 0
NAUSEA: 0
CONSTIPATION: 0
DIARRHEA: 0
SORE THROAT: 0
SHORTNESS OF BREATH: 0

## 2021-10-20 NOTE — PROGRESS NOTES
[facility]  Preoperative visit   10/20/2021    Patient is here for preop evaluation at the request of Dr. Dary Retana for tympanostomy tube removal, myringotomy. Is scheduled for surgery on 10/26/21    Functional Assessment:  Exercise tolerance: >4 METS using the DASI calculator   Denies any current chest pain or discomfort, sob or ANDREWS, orthopnea, PND or leg swelling. Medications: reviewed, Over the counter (NSAIDs) use: None    Cardiac Risk:  High-risk Surgery: No / Hx of ischemic heart disease: No / Hx of CHF: No / Hx of CVA: No / Pre-op insulin: No / Pre-op creatinine >2.0:  (last cr 0.5)    JENNIFER Screen:  Snore loudly? Yes /  Feel tired/fatigued during the day? Yes  /  Stop breathing while sleeping? Unsure / High blood pressure? Prehypertension / Morning HA? No    History of surgery/ anesthesia:  - No hx of complications, anesthesia reaction, bleeding, bruising, clots  - No family Hx of reactions to anesthesia/ bleeding/clots  - No loose teeth/ dentures    - Support systems after surgery: patient's boyfriend   - Smoking/ alcohol/drugs: Smoking 1 ppd (4 pack years), 1-2 drinks of liquor once monthly, denies illicit drug use including marijuana     Chronic complicating health conditions present:  Morbid obesity, undiagnosed JENNIFER, GERD, hypothyroidism, anxiety and depression, ADD, tobacco use disorder    Problem List  Patient Active Problem List   Diagnosis    GERD (gastroesophageal reflux disease)    Morbid obesity ([address]    Hypothyroid    Attention deficit hyperactivity disorder (ADHD)    PTSD (post-traumatic stress disorder)    JEANNE (generalized anxiety disorder)    Severe recurrent major depression with psychotic features ([address]    Current smoker       Medical and Surgical History  Past Medical History:   Diagnosis Date    Attention deficit disorder with hyperactivity(314.01)     JEANNE (generalized anxiety disorder)     GERD (gastroesophageal reflux disease)     Gonorrhea 10/18/2020    Hypothyroid  PTSD (post-traumatic stress disorder)     Severe recurrent major depression with psychotic features ([facility]      Past Surgical History:   Procedure Laterality Date    TONSILLECTOMY      TYMPANOSTOMY TUBE PLACEMENT      x3 bilateral       Medications  Antibiotics as prescribed by Dr. Dary Retana, otherwise none      Allergies  Allergies   Allergen Reactions    Augmentin [Amoxicillin-Pot Clavulanate] Other (See Comments)     unknown     Social History  Social History     Tobacco Use    Smoking status: Current Every Day Smoker     Packs/day: 1.00    Smokeless tobacco: Never Used   Substance Use Topics    Alcohol use: Yes     Comment: 1-2 drinks per month   ,   Social History     Substance and Sexual Activity   Drug Use No       ROS  Review of Systems   Constitutional: Positive for fatigue. Negative for fever and unexpected weight change. HENT: Positive for hearing loss. Negative for congestion, ear pain and sore throat. Respiratory: Negative for cough and shortness of breath. Cardiovascular: Negative for chest pain and leg swelling. Gastrointestinal: Negative for constipation, diarrhea and nausea. Genitourinary: Positive for dysuria. Negative for flank pain, hematuria and vaginal discharge. Neurological: Negative for light-headedness and headaches. Hematological: Does not bruise/bleed easily. Psychiatric/Behavioral: Positive for sleep disturbance. Negative for suicidal ideas. The patient is not nervous/anxious. Physical Exam  Vitals:    10/20/21 1231   BP: 135/77   Pulse: 82   Weight: (!) 328 lb 6.4 oz (149 kg)   Height: 5' (1.524 m)     Estimated body mass index is 64.14 kg/m² as calculated from the following:    Height as of this encounter: 5' (1.524 m). Weight as of this encounter: 328 lb 6.4 oz (149 kg). Physical Exam  Vitals reviewed. Constitutional:       Appearance: Normal appearance. She is obese. HENT:      Head: Normocephalic and atraumatic. Right Ear:  There is no impacted cerumen. Left Ear: There is no impacted cerumen. Ears:      Comments: Right ear with tympanostomy tube surrounded by granulation tissue, left ear with granulation tissue covering membrane. No tube visible     Nose: Nose normal.      Mouth/Throat:      Mouth: Mucous membranes are moist.      Pharynx: Oropharynx is clear. Eyes:      Conjunctiva/sclera: Conjunctivae normal.      Pupils: Pupils are equal, round, and reactive to light. Cardiovascular:      Rate and Rhythm: Normal rate and regular rhythm. Pulses: Normal pulses. Heart sounds: Normal heart sounds. Pulmonary:      Effort: Pulmonary effort is normal.      Breath sounds: Normal breath sounds. Abdominal:      General: Abdomen is flat. Palpations: Abdomen is soft. Tenderness: There is no right CVA tenderness or left CVA tenderness. Musculoskeletal:         General: Normal range of motion. Cervical back: Normal range of motion and neck supple. Skin:     General: Skin is warm. Neurological:      Mental Status: She is alert. Psychiatric:         Mood and Affect: Mood normal.         Behavior: Behavior normal.         Labs and Studies  Lab Results   Component Value Date     11/18/2020    K 3.9 11/18/2020     11/18/2020    CO2 25 11/18/2020    BUN 8 11/18/2020    CREATININE <0.5 (L) 11/18/2020    GLUCOSE 91 11/18/2020    CALCIUM 8.9 11/18/2020    LABALBU 3.7 11/18/2020    PHOS 3.2 11/18/2020     Lab Results   Component Value Date    WBC 8.6 11/18/2020    HGB 12.2 11/18/2020    HCT 38.2 11/18/2020    MCV 79.3 (L) 11/18/2020     11/18/2020     No components found for: HGBA1C    Assessment and Plan   1. Preoperative clearance  ASA class III, RCRI 0. Likely undiagnosed JENNIFER. Patient did not bring her preoperative forms to this appointment for completion, so I will forward this note to Dr. Barbara Ortiz and complete/imaging/referrals as deemed appropriate per her health conditions.   - EKG 12 Lead  - CBC Auto Differential; Future  - Comprehensive Metabolic Panel; Future  - Hemoglobin A1C; Future  - Lipid Panel; Future  - Hepatitis C Antibody; Future  - HIV Screen; Future  - PROTIME-INR; Future  - PREGNANCY, URINE; Future    2. Recurrent AOM (acute otitis media) of both ears  Surgery scheduled for the 28th    3. Morbid obesity ([facility]  Screening labs, patient is working on her diet. She will follow-up in 2 to 4 weeks for annual physical and discussion of options for weight loss  - Comprehensive Metabolic Panel; Future  - Hemoglobin A1C; Future  - Lipid Panel; Future  - Jammie Hung MD, Sleep Medicine, [facility]    4. Snoring  Referral for sleep study  - Jammie Hung MD, Sleep Medicine, [facility]    5. Cervical cancer screening  Referral for LARC and Pap smear  - [address], Ever DO [place], Gynecology, [facility]    6. Encounter for assessment of STD exposure  With history of PID secondary to chlamydia. Recheck STD panel  - Hepatitis C Antibody; Future  - HIV Screen; Future  - C.trachomatis N.gonorrhoeae DNA, Urine; Future  - RPR Reflex; Future  - Trichomonas by EIA; Future    7. Dysuria  Check for UTI and STD screening per above  - Urinalysis with Microscopic; Future  - Culture, Urine; Future    8. Annual physical exam  Screening labs ordered, vaccines administered. Follow-up for annual physical.  Encourage patient to receive the Covid vaccine. - Pneumococcal polysaccharide vaccine 23-valent greater than or equal to 3yo subcutaneous/IM  - INFLUENZA, MDCK QUADV, 2 YRS AND OLDER, IM, PF, PREFILL SYR OR SDV, 0.5ML (FLUCELVAX QUADV, PF)    9. Gastroesophageal reflux disease, unspecified whether esophagitis present  Uncontrolled, discussed initiation of Prilosec and possible work-up at future appointment    10. Hypothyroidism, unspecified type  Unknown TSH, not currently taking Synthroid. Recheck  - TSH with Reflex; Future    11.  Current smoker  Cessation strongly encouraged for general health and for upcoming surgery. Patient is precontemplative at this time. Here for preoperative consultation. Patient's revised cardiac risk stratification is 0 points. ASA class III. No current complaints to suggest active cardiac heart disease. No indication for further cardiac work up prior to surgery at this time. - HIGH risk for JENNIFER. consider sleep study down the road for diagnosis. Recommend intra/postop pulmonary monitoring as for high risk for JENNIFER. - EKG  - Labs  - Pt was instructed to not take ASA 10 days prior to surgery, and not take pain medication from the NSAIDs family 7 days prior to surgery. Orders Placed This Encounter   Procedures    C.trachomatis N.gonorrhoeae DNA, Urine    Culture, Urine    Pneumococcal polysaccharide vaccine 23-valent greater than or equal to 1yo subcutaneous/IM    INFLUENZA, MDCK QUADV, 2 YRS AND OLDER, IM, PF, PREFILL SYR OR SDV, 0.5ML (FLUCELVAX QUADV, PF)    CBC Auto Differential    Comprehensive Metabolic Panel    Hemoglobin A1C    Lipid Panel    TSH with Reflex    Hepatitis C Antibody    HIV Screen    PROTIME-INR    PREGNANCY, URINE    RPR Reflex    Trichomonas by EIA    Urinalysis with Microscopic    Adriana Rangel MD, Sleep Medicine, [address], Gynecology, [facility]    EKG 12 Lead         ASA CLASS:  ASA I A normal healthy patient Healthy, non-smoking, no or minimal alcohol use   ASA II A patient with mild systemic disease Mild diseases only without substantive functional limitations. Examples include (but not limited to): current smoker, social alcohol drinker, pregnancy, obesity (30 < BMI < 40), well-controlled DM/HTN, mild lung disease   ASA III A patient with severe systemic disease Substantive functional limitations; One or more moderate to severe diseases.  Examples include (but not limited to): poorly controlled DM or HTN, COPD, morbid obesity (BMI ?40), active hepatitis, alcohol dependence or abuse, implanted pacemaker, moderate reduction of ejection fraction, ESRD undergoing regularly scheduled dialysis, premature infant PCA < 60 weeks, history (>3 months) of MI, CVA, TIA, or CAD/stents. ASA IV A patient with severe systemic disease that is a constant threat to life Examples include (but not limited to): recent ( < 3 months) MI, CVA, TIA, or CAD/stents, ongoing cardiac ischemia or severe valve dysfunction, severe reduction of ejection fraction, sepsis, DIC, SOCRATES or ESRD not undergoing regularly scheduled dialysis   ASA V A moribund patient who is not expected to survive without the operation Examples include (but not limited to): ruptured abdominal/thoracic aneurysm, massive trauma, intracranial bleed with mass effect, ischemic bowel in the face of significant cardiac pathology or multiple organ/system dysfunction   ASA VI A declared brain-dead patient whose organs are being removed for donor purposes       RCRI Score Risk of major cardiac event*   0- class I 3.9%   1- class II 6%   2- class III 10.1%   ? 3- class IV 15%   *Defined as myocardial infarction, pulmonary edema, ventricular fibrillation/primary cardiac arrest, or complete heart block. Carolina Dotson, DO     This dictation was generated by voice recognition computer software. Although all attempts are made to edit the dictation for accuracy, there may be errors in the transcription that are not intended.

## 2021-10-20 NOTE — PATIENT INSTRUCTIONS
Patient Education        Learning About Low-Carbohydrate Diets  What is a low-carbohydrate diet? A low-carbohydrate (or \"low-carb\") diet limits foods and drinks that have carbohydrates. This includes grains, fruits, milk and yogurt, and starchy vegetables like potatoes, beans, and corn. It also avoids foods and drinks that have added sugar. Instead, low-carb diets include foods that are high in protein and fat. Why might you follow a low-carb diet? Low-carb diets may be used for a variety of reasons, such as for weight loss. People who have diabetes may use a low-carb diet to help manage their blood sugar levels. What should you do before you start the diet? Talk to your doctor before you try any diet. This is even more important if you have health problems like kidney disease, heart disease, or diabetes. Your doctor may suggest that you meet with a registered dietitian. A dietitian can help you make an eating plan that works for you. What foods do you eat on a low-carb diet? On a low-carb diet, you choose foods that are high in protein and fat. Examples of these are:  · Meat, poultry, and fish. · Eggs. · Nuts, such as walnuts, pecans, almonds, and peanuts. · Peanut butter and other nut butters. · Tofu. · Avocado. · [place]. · Non-starchy vegetables like broccoli, cauliflower, green beans, mushrooms, peppers, lettuce, and spinach. · Unsweetened non-dairy milks like almond milk and coconut milk. · Cheese, cottage cheese, and cream cheese. Current as of: December 17, 2020               Content Version: 13.0  © 2006-2021 [employer]. Care instructions adapted under license by [facility] ([facility]). If you have questions about a medical condition or this instruction, always ask your healthcare professional. [address] any warranty or liability for your use of this information.

## 2021-10-21 ENCOUNTER — TELEPHONE (OUTPATIENT)
Dept: PRIMARY CARE CLINIC | Age: 21
End: 2021-10-21

## 2021-10-21 ENCOUNTER — E-VISIT (OUTPATIENT)
Dept: PRIMARY CARE CLINIC | Age: 21
End: 2021-10-21
Payer: MEDICAID

## 2021-10-21 DIAGNOSIS — A59.9 TRICHOMONAS INFECTION: Primary | ICD-10-CM

## 2021-10-21 DIAGNOSIS — M79.603 PAIN OF UPPER EXTREMITY, UNSPECIFIED LATERALITY: Primary | ICD-10-CM

## 2021-10-21 DIAGNOSIS — T50.Z95A ADVERSE EFFECT OF VACCINE, INITIAL ENCOUNTER: Primary | ICD-10-CM

## 2021-10-21 LAB
C. TRACHOMATIS DNA ,URINE: NEGATIVE
ESTIMATED AVERAGE GLUCOSE: 122.6 MG/DL
HBA1C MFR BLD: 5.9 %
HIV AG/AB: NORMAL
HIV ANTIGEN: NORMAL
HIV-1 ANTIBODY: NORMAL
HIV-2 AB: NORMAL
INR BLD: 1.1 (ref 0.88–1.12)
N. GONORRHOEAE DNA, URINE: NEGATIVE
PROTHROMBIN TIME: 12.5 SEC (ref 9.9–12.7)
TOTAL SYPHILLIS IGG/IGM: NORMAL
URINE CULTURE, ROUTINE: NORMAL

## 2021-10-21 PROCEDURE — 99422 OL DIG E/M SVC 11-20 MIN: CPT | Performed by: INTERNAL MEDICINE

## 2021-10-21 RX ORDER — METHYLPREDNISOLONE 4 MG/1
TABLET ORAL
Qty: 1 KIT | Refills: 0 | Status: SHIPPED | OUTPATIENT
Start: 2021-10-21 | End: 2021-10-27

## 2021-10-21 RX ORDER — NAPROXEN 500 MG/1
500 TABLET ORAL 2 TIMES DAILY WITH MEALS
Qty: 30 TABLET | Refills: 0 | Status: SHIPPED | OUTPATIENT
Start: 2021-10-21 | End: 2022-05-09

## 2021-10-21 RX ORDER — METRONIDAZOLE 500 MG/1
500 TABLET ORAL 2 TIMES DAILY
Qty: 14 TABLET | Refills: 0 | Status: SHIPPED | OUTPATIENT
Start: 2021-10-21 | End: 2021-10-28

## 2021-10-21 NOTE — ED NOTES
Pt called to triage. No answer. Pt taken off of ER tracking board.       Renita Fitzpatrick RN  10/20/21 7790

## 2021-10-21 NOTE — PROGRESS NOTES
Please try the ibuprofen first for the pain in your arm. I will send a prescription to the pharmacy for a Medrol Dosepak which should help with the inflammation from the shots that you recently received. Please follow-up with your primary care physician for ongoing care. I am assuming that your arm pain and headache are related. If your headache does not resolve with the medication suggested please call your primary care physician for further instructions. 11 to 20 minutes were spent on this E visit.

## 2021-10-21 NOTE — TELEPHONE ENCOUNTER
----- Message from Malou Chadwick DO sent at 10/21/2021  7:59 AM EDT -----  Please call patient and tell her that her STD screening is positive for trichomonas. I will send in an antibiotic to her pharmacy and her partner should also be treated. Still waiting on the results of the rest of her urine tests to determine if she also has a UTI.

## 2021-10-25 ENCOUNTER — ANESTHESIA EVENT (OUTPATIENT)
Dept: OPERATING ROOM | Age: 21
End: 2021-10-25
Payer: MEDICAID

## 2021-10-26 ENCOUNTER — HOSPITAL ENCOUNTER (OUTPATIENT)
Age: 21
Setting detail: OUTPATIENT SURGERY
Discharge: HOME | End: 2021-10-26
Attending: OTOLARYNGOLOGY | Admitting: OTOLARYNGOLOGY
Payer: MEDICAID

## 2021-10-26 ENCOUNTER — ANESTHESIA (OUTPATIENT)
Dept: OPERATING ROOM | Age: 21
End: 2021-10-26
Payer: MEDICAID

## 2021-10-26 VITALS
RESPIRATION RATE: 15 BRPM | OXYGEN SATURATION: 95 % | SYSTOLIC BLOOD PRESSURE: 147 MMHG | DIASTOLIC BLOOD PRESSURE: 118 MMHG

## 2021-10-26 VITALS
TEMPERATURE: 98 F | OXYGEN SATURATION: 96 % | BODY MASS INDEX: 57.52 KG/M2 | RESPIRATION RATE: 14 BRPM | WEIGHT: 293 LBS | HEART RATE: 86 BPM | HEIGHT: 60 IN | DIASTOLIC BLOOD PRESSURE: 74 MMHG | SYSTOLIC BLOOD PRESSURE: 130 MMHG

## 2021-10-26 DIAGNOSIS — H92.13 OTORRHEA OF BOTH EARS: ICD-10-CM

## 2021-10-26 DIAGNOSIS — H90.6 HEARING LOSS, MIXED, BILATERAL: ICD-10-CM

## 2021-10-26 LAB — PREGNANCY, URINE: NEGATIVE

## 2021-10-26 PROCEDURE — 2500000003 HC RX 250 WO HCPCS: Performed by: NURSE ANESTHETIST, CERTIFIED REGISTERED

## 2021-10-26 PROCEDURE — 3700000000 HC ANESTHESIA ATTENDED CARE: Performed by: OTOLARYNGOLOGY

## 2021-10-26 PROCEDURE — 7100000000 HC PACU RECOVERY - FIRST 15 MIN: Performed by: OTOLARYNGOLOGY

## 2021-10-26 PROCEDURE — 6360000002 HC RX W HCPCS: Performed by: NURSE ANESTHETIST, CERTIFIED REGISTERED

## 2021-10-26 PROCEDURE — 7100000011 HC PHASE II RECOVERY - ADDTL 15 MIN: Performed by: OTOLARYNGOLOGY

## 2021-10-26 PROCEDURE — 7100000010 HC PHASE II RECOVERY - FIRST 15 MIN: Performed by: OTOLARYNGOLOGY

## 2021-10-26 PROCEDURE — 3600000004 HC SURGERY LEVEL 4 BASE: Performed by: OTOLARYNGOLOGY

## 2021-10-26 PROCEDURE — 3600000014 HC SURGERY LEVEL 4 ADDTL 15MIN: Performed by: OTOLARYNGOLOGY

## 2021-10-26 PROCEDURE — 2580000003 HC RX 258: Performed by: ANESTHESIOLOGY

## 2021-10-26 PROCEDURE — 69421 INCISION OF EARDRUM: CPT | Performed by: OTOLARYNGOLOGY

## 2021-10-26 PROCEDURE — 2709999900 HC NON-CHARGEABLE SUPPLY: Performed by: OTOLARYNGOLOGY

## 2021-10-26 PROCEDURE — 2500000003 HC RX 250 WO HCPCS: Performed by: OTOLARYNGOLOGY

## 2021-10-26 PROCEDURE — 7100000001 HC PACU RECOVERY - ADDTL 15 MIN: Performed by: OTOLARYNGOLOGY

## 2021-10-26 PROCEDURE — 88305 TISSUE EXAM BY PATHOLOGIST: CPT

## 2021-10-26 PROCEDURE — 6370000000 HC RX 637 (ALT 250 FOR IP): Performed by: OTOLARYNGOLOGY

## 2021-10-26 PROCEDURE — 2580000003 HC RX 258: Performed by: OTOLARYNGOLOGY

## 2021-10-26 PROCEDURE — 69424 REMOVE VENTILATING TUBE: CPT | Performed by: OTOLARYNGOLOGY

## 2021-10-26 PROCEDURE — 3700000001 HC ADD 15 MINUTES (ANESTHESIA): Performed by: OTOLARYNGOLOGY

## 2021-10-26 PROCEDURE — 84703 CHORIONIC GONADOTROPIN ASSAY: CPT

## 2021-10-26 PROCEDURE — 69145 REMOVE EAR CANAL LESION(S): CPT | Performed by: OTOLARYNGOLOGY

## 2021-10-26 PROCEDURE — 2580000003 HC RX 258: Performed by: NURSE ANESTHETIST, CERTIFIED REGISTERED

## 2021-10-26 RX ORDER — SODIUM CHLORIDE 0.9 % (FLUSH) 0.9 %
10 SYRINGE (ML) INJECTION EVERY 12 HOURS SCHEDULED
Status: DISCONTINUED | OUTPATIENT
Start: 2021-10-26 | End: 2021-10-26 | Stop reason: HOSPADM

## 2021-10-26 RX ORDER — PROPOFOL 10 MG/ML
INJECTION, EMULSION INTRAVENOUS PRN
Status: DISCONTINUED | OUTPATIENT
Start: 2021-10-26 | End: 2021-10-26 | Stop reason: SDUPTHER

## 2021-10-26 RX ORDER — MEPERIDINE HYDROCHLORIDE 25 MG/ML
12.5 INJECTION INTRAMUSCULAR; INTRAVENOUS; SUBCUTANEOUS EVERY 5 MIN PRN
Status: DISCONTINUED | OUTPATIENT
Start: 2021-10-26 | End: 2021-10-26 | Stop reason: HOSPADM

## 2021-10-26 RX ORDER — LABETALOL HYDROCHLORIDE 5 MG/ML
5 INJECTION, SOLUTION INTRAVENOUS EVERY 10 MIN PRN
Status: DISCONTINUED | OUTPATIENT
Start: 2021-10-26 | End: 2021-10-26 | Stop reason: HOSPADM

## 2021-10-26 RX ORDER — OFLOXACIN 3 MG/ML
SOLUTION/ DROPS OPHTHALMIC
Status: COMPLETED | OUTPATIENT
Start: 2021-10-26 | End: 2021-10-26

## 2021-10-26 RX ORDER — MORPHINE SULFATE 2 MG/ML
2 INJECTION, SOLUTION INTRAMUSCULAR; INTRAVENOUS EVERY 5 MIN PRN
Status: DISCONTINUED | OUTPATIENT
Start: 2021-10-26 | End: 2021-10-26 | Stop reason: HOSPADM

## 2021-10-26 RX ORDER — HYDRALAZINE HYDROCHLORIDE 20 MG/ML
5 INJECTION INTRAMUSCULAR; INTRAVENOUS
Status: DISCONTINUED | OUTPATIENT
Start: 2021-10-26 | End: 2021-10-26 | Stop reason: HOSPADM

## 2021-10-26 RX ORDER — FENTANYL CITRATE 50 UG/ML
INJECTION, SOLUTION INTRAMUSCULAR; INTRAVENOUS PRN
Status: DISCONTINUED | OUTPATIENT
Start: 2021-10-26 | End: 2021-10-26 | Stop reason: SDUPTHER

## 2021-10-26 RX ORDER — OXYCODONE HYDROCHLORIDE AND ACETAMINOPHEN 5; 325 MG/1; MG/1
2 TABLET ORAL PRN
Status: DISCONTINUED | OUTPATIENT
Start: 2021-10-26 | End: 2021-10-26 | Stop reason: HOSPADM

## 2021-10-26 RX ORDER — SUCCINYLCHOLINE/SOD CL,ISO/PF 200MG/10ML
SYRINGE (ML) INTRAVENOUS PRN
Status: DISCONTINUED | OUTPATIENT
Start: 2021-10-26 | End: 2021-10-26 | Stop reason: SDUPTHER

## 2021-10-26 RX ORDER — SODIUM CHLORIDE 0.9 % (FLUSH) 0.9 %
10 SYRINGE (ML) INJECTION PRN
Status: DISCONTINUED | OUTPATIENT
Start: 2021-10-26 | End: 2021-10-26 | Stop reason: HOSPADM

## 2021-10-26 RX ORDER — DEXAMETHASONE SODIUM PHOSPHATE 1 MG/ML
SOLUTION/ DROPS OPHTHALMIC
Status: COMPLETED | OUTPATIENT
Start: 2021-10-26 | End: 2021-10-26

## 2021-10-26 RX ORDER — SODIUM CHLORIDE 9 MG/ML
INJECTION, SOLUTION INTRAVENOUS CONTINUOUS PRN
Status: DISCONTINUED | OUTPATIENT
Start: 2021-10-26 | End: 2021-10-26 | Stop reason: SDUPTHER

## 2021-10-26 RX ORDER — LIDOCAINE HYDROCHLORIDE AND EPINEPHRINE 10; 10 MG/ML; UG/ML
INJECTION, SOLUTION INFILTRATION; PERINEURAL
Status: COMPLETED | OUTPATIENT
Start: 2021-10-26 | End: 2021-10-26

## 2021-10-26 RX ORDER — SODIUM CHLORIDE 9 MG/ML
INJECTION, SOLUTION INTRAVENOUS CONTINUOUS
Status: DISCONTINUED | OUTPATIENT
Start: 2021-10-26 | End: 2021-10-26 | Stop reason: HOSPADM

## 2021-10-26 RX ORDER — GLYCOPYRROLATE 0.2 MG/ML
INJECTION INTRAMUSCULAR; INTRAVENOUS PRN
Status: DISCONTINUED | OUTPATIENT
Start: 2021-10-26 | End: 2021-10-26 | Stop reason: SDUPTHER

## 2021-10-26 RX ORDER — MIDAZOLAM HYDROCHLORIDE 1 MG/ML
INJECTION INTRAMUSCULAR; INTRAVENOUS PRN
Status: DISCONTINUED | OUTPATIENT
Start: 2021-10-26 | End: 2021-10-26 | Stop reason: SDUPTHER

## 2021-10-26 RX ORDER — SODIUM CHLORIDE 9 MG/ML
25 INJECTION, SOLUTION INTRAVENOUS PRN
Status: DISCONTINUED | OUTPATIENT
Start: 2021-10-26 | End: 2021-10-26 | Stop reason: HOSPADM

## 2021-10-26 RX ORDER — LIDOCAINE HYDROCHLORIDE 20 MG/ML
INJECTION, SOLUTION EPIDURAL; INFILTRATION; INTRACAUDAL; PERINEURAL PRN
Status: DISCONTINUED | OUTPATIENT
Start: 2021-10-26 | End: 2021-10-26 | Stop reason: SDUPTHER

## 2021-10-26 RX ORDER — ONDANSETRON 2 MG/ML
INJECTION INTRAMUSCULAR; INTRAVENOUS PRN
Status: DISCONTINUED | OUTPATIENT
Start: 2021-10-26 | End: 2021-10-26 | Stop reason: SDUPTHER

## 2021-10-26 RX ORDER — DEXAMETHASONE SODIUM PHOSPHATE 4 MG/ML
INJECTION, SOLUTION INTRA-ARTICULAR; INTRALESIONAL; INTRAMUSCULAR; INTRAVENOUS; SOFT TISSUE PRN
Status: DISCONTINUED | OUTPATIENT
Start: 2021-10-26 | End: 2021-10-26 | Stop reason: SDUPTHER

## 2021-10-26 RX ORDER — KETAMINE HCL IN NACL, ISO-OSM 100MG/10ML
SYRINGE (ML) INJECTION PRN
Status: DISCONTINUED | OUTPATIENT
Start: 2021-10-26 | End: 2021-10-26 | Stop reason: SDUPTHER

## 2021-10-26 RX ORDER — MORPHINE SULFATE 2 MG/ML
1 INJECTION, SOLUTION INTRAMUSCULAR; INTRAVENOUS EVERY 5 MIN PRN
Status: DISCONTINUED | OUTPATIENT
Start: 2021-10-26 | End: 2021-10-26 | Stop reason: HOSPADM

## 2021-10-26 RX ORDER — ONDANSETRON 2 MG/ML
4 INJECTION INTRAMUSCULAR; INTRAVENOUS
Status: DISCONTINUED | OUTPATIENT
Start: 2021-10-26 | End: 2021-10-26 | Stop reason: HOSPADM

## 2021-10-26 RX ORDER — OXYCODONE HYDROCHLORIDE AND ACETAMINOPHEN 5; 325 MG/1; MG/1
1 TABLET ORAL PRN
Status: DISCONTINUED | OUTPATIENT
Start: 2021-10-26 | End: 2021-10-26 | Stop reason: HOSPADM

## 2021-10-26 RX ORDER — MAGNESIUM HYDROXIDE 1200 MG/15ML
LIQUID ORAL CONTINUOUS PRN
Status: COMPLETED | OUTPATIENT
Start: 2021-10-26 | End: 2021-10-26

## 2021-10-26 RX ADMIN — DEXAMETHASONE SODIUM PHOSPHATE 8 MG: 4 INJECTION, SOLUTION INTRAMUSCULAR; INTRAVENOUS at 14:32

## 2021-10-26 RX ADMIN — SODIUM CHLORIDE: 9 INJECTION, SOLUTION INTRAVENOUS at 13:00

## 2021-10-26 RX ADMIN — Medication 200 MG: at 14:27

## 2021-10-26 RX ADMIN — PROPOFOL 300 MG: 10 INJECTION, EMULSION INTRAVENOUS at 14:27

## 2021-10-26 RX ADMIN — MIDAZOLAM 2 MG: 1 INJECTION INTRAMUSCULAR; INTRAVENOUS at 14:23

## 2021-10-26 RX ADMIN — GLYCOPYRROLATE 0.2 MG: 0.2 INJECTION, SOLUTION INTRAMUSCULAR; INTRAVENOUS at 14:32

## 2021-10-26 RX ADMIN — SODIUM CHLORIDE: 9 INJECTION, SOLUTION INTRAVENOUS at 14:50

## 2021-10-26 RX ADMIN — Medication 30 MG: at 14:59

## 2021-10-26 RX ADMIN — SODIUM CHLORIDE: 9 INJECTION, SOLUTION INTRAVENOUS at 14:22

## 2021-10-26 RX ADMIN — FENTANYL CITRATE 100 MCG: 50 INJECTION INTRAMUSCULAR; INTRAVENOUS at 14:32

## 2021-10-26 RX ADMIN — LIDOCAINE HYDROCHLORIDE 100 MG: 20 INJECTION, SOLUTION EPIDURAL; INFILTRATION; INTRACAUDAL; PERINEURAL at 14:27

## 2021-10-26 RX ADMIN — ONDANSETRON 4 MG: 2 INJECTION INTRAMUSCULAR; INTRAVENOUS at 14:32

## 2021-10-26 ASSESSMENT — PULMONARY FUNCTION TESTS
PIF_VALUE: 68
PIF_VALUE: 12
PIF_VALUE: 2
PIF_VALUE: 36
PIF_VALUE: 1
PIF_VALUE: 29
PIF_VALUE: 34
PIF_VALUE: 1
PIF_VALUE: 17
PIF_VALUE: 34
PIF_VALUE: 27
PIF_VALUE: 2
PIF_VALUE: 33
PIF_VALUE: 33
PIF_VALUE: 32
PIF_VALUE: 1
PIF_VALUE: 1
PIF_VALUE: 57
PIF_VALUE: 1
PIF_VALUE: 7
PIF_VALUE: 10
PIF_VALUE: 5
PIF_VALUE: 30
PIF_VALUE: 9
PIF_VALUE: 60
PIF_VALUE: 13
PIF_VALUE: 12
PIF_VALUE: 19
PIF_VALUE: 30
PIF_VALUE: 35
PIF_VALUE: 50
PIF_VALUE: 9

## 2021-10-26 ASSESSMENT — PAIN SCALES - GENERAL
PAINLEVEL_OUTOF10: 0
PAINLEVEL_OUTOF10: 0
PAINLEVEL_OUTOF10: 4

## 2021-10-26 ASSESSMENT — PAIN - FUNCTIONAL ASSESSMENT: PAIN_FUNCTIONAL_ASSESSMENT: 0-10

## 2021-10-26 ASSESSMENT — ENCOUNTER SYMPTOMS: SHORTNESS OF BREATH: 0

## 2021-10-26 NOTE — H&P
I have reviewed this patient's history and physical.  There have been no significant changes. The patient was counseled at length about the risks of sendy Covid-19 during their perioperative period and any recovery window from their procedure. The patient was made aware that sendy Covid-19  may worsen their prognosis for recovering from their procedure  and lend to a higher morbidity and/or mortality risk. All material risks, benefits, and reasonable alternatives including postponing the procedure were discussed. The patient does wish to proceed with the procedure at this time. The patient understands the risk of bilateral ear exam under anesthesia with removal of retained ear tubes/patch myringoplasty. She has agreed to proceed.

## 2021-10-26 NOTE — ANESTHESIA POSTPROCEDURE EVALUATION
Department of Anesthesiology  Postprocedure Note    Patient: Arnaldo Prieto  MRN: [id number]  YOB: 2000  Date of evaluation: 10/26/2021  Time:  5:09 PM     Procedure Summary     Date: 10/26/21 Room / Location: [address] [place] [address] / [address] [place] [address]    Anesthesia Start: 1423 Anesthesia Stop: 1503    Procedures:       EXCISION  RIGHT EAR CANAL LESION, REMOVAL RIGHT EAR TUBE, LEFT DIAGNOSTIC MYRINGOTOMY  (Bilateral )      EXCISION  RIGHT EAR CANAL LESION, REMOVAL RIGHT EAR TUBE, LEFT DIAGNOSTIC MYRINGOTOMY  (Right ) Diagnosis:       Otorrhea of both ears      Hearing loss, mixed, bilateral      (CHRONIC BILATERA OTORRHEA, BILATERAL MIXED HEARING LOSS)    Surgeons: Ryan Haynes MD Responsible Provider: Andrez Huerta MD    Anesthesia Type: General ASA Status: 3          Anesthesia Type: General    Mio Phase I: Mio Score: 8    Mio Phase II: Mio Score: 10    Last vitals: Reviewed and per EMR flowsheets.        Anesthesia Post Evaluation    Patient location during evaluation: PACU  Level of consciousness: awake and alert  Airway patency: patent  Nausea & Vomiting: no nausea and no vomiting  Complications: no  Cardiovascular status: blood pressure returned to baseline  Respiratory status: acceptable  Hydration status: euvolemic  Comments: Postoperative Anesthesia Note    Name:    Arnaldo Prieto  MRN:      [id number]    Patient Vitals in the past 12 hrs:  10/26/21 1550, BP:130/74, Pulse:86, Resp:14, SpO2:96 %  10/26/21 1534, Pulse:91  10/26/21 1530, BP:(!) 152/96, Temp:98 °F (36.7 °C), Pulse:87, Resp:21, SpO2:95 %  10/26/21 1525, BP:122/75, Pulse:87, Resp:20, SpO2:96 %  10/26/21 1520, BP:136/85, Pulse:89, Resp:19, SpO2:99 %  10/26/21 1515, BP:(!) 145/90, Pulse:91, Resp:19, SpO2:100 %  10/26/21 1510, BP:(!) 144/91, Pulse:96, Resp:26, SpO2:100 %  10/26/21 1505, BP:(!) 171/116, Pulse:102, Resp:16, SpO2:92 %  10/26/21 1501, BP:(!) 154/100, Temp:97.1 °F (36.2 °C), Temp

## 2021-10-26 NOTE — OP NOTE
[address] [facility]GERY  OPERATIVE REPORT    Patient Name: Rosemarie Lux  YOB: 2000  Medical Record Number:  [id number]  Billing Number:  [id number]  Date of Procedure: [unfilled]  Time: 9888    Pre Operative Diagnoses:   Problem List Items Addressed This Visit     None      Visit Diagnoses     Otorrhea of both ears        Relevant Orders    Surgical Pathology    Hearing loss, mixed, bilateral        Relevant Orders    Surgical Pathology        Post Operative Diagnoses:    Problem List Items Addressed This Visit     None      Visit Diagnoses     Otorrhea of both ears        Relevant Orders    Surgical Pathology    Hearing loss, mixed, bilateral        Relevant Orders    Surgical Pathology                 Procedure:  Excision of right external auditory canal lesion (15541)  Removal of retained right ear tube (05224)   Left ear exam under anesthesia with myringotomy (55240)       Surgeon: Suzzanna Prader, MD    OR Staff/ Assistant:  Circulator: Pippa Lowry RN  Surgical Assistant: Herlinda Huggins; Colin Thomas    Anesthesia:  General anesthesia. Findings:  1) papillomatous lesion lesion right lateral superior canal.    2.   Retained tube in right ear; copious granulation tissue in middle ear  3. Purulent drainage in left ear canal; no obvious foreign body. Myringotomy made. Severe granulation tissue in middle ear; could not see a foreign body    Indications: This is a 24 y.o. female with chronic bilateral ear problems. She had a retained tube on the right as well as an ear canal lesion. Otorrhea on the left. Here for ear exam, removal of canal lesion and retained tubes. Risks and benefits discussed with the patient including alternate treatment options, Informed consent was obtained, the patient elected to proceed with the planned procedure.     DETAILS OF PROCEDURE(S):    The patient was brought into the operating room and placed in a supine position on the operating table. She underwent uncomplicated general anesthesia with endotracheal intubation. The head of bed was turned 180 degrees. The patient was prepped and draped. The right ear was visualized with the binocular scope. There was a papillomatous lesion on the lateral canal.  1 mL of 1% lidocaine with epi was injected around it. It was grasped and removed. This was sent to pathology. Medial to the lesion was purulent drainage that I removed with a Waller suction. This revealed a retained ear tube. This was removed. The middle ear was full of granulationtissue/pululent fluid so a patch myringoplasty was felt to be pointless. A cotton ball was placed. The left ear was then visualized. Purulent drainage was removed from the ear canal. The tympanic membrane appeared to be intact, but it was covered with granulation tissue that bled easily. A small myringotomy was made. There was not an obvious foreign body in the middle ear, but it was full of granulation tissue that was bleeding easily. A cotton ball was placed. The head of bed was rotated back 180 degrees. She was allowed to awaken, extubated and taken to recovery. I attest that I was present for and did the entire procedure myself. Estimated Blood Loss: 5 mL               Specimens:   ID Type Source Tests Collected by Time Destination   A : A  right ear canal lesion Tissue Tissue SURGICAL PATHOLOGY Ryan Haynes MD 10/26/2021 3808         Complications: There were no complications.     Ryan Haynes MD

## 2021-10-26 NOTE — ANESTHESIA PRE PROCEDURE
Vascular: negative vascular ROS. Other Findings:             Anesthesia Plan      general     ASA 3     (I discussed with the patient the risks and benefits of PIV, general anesthesia, IV Narcotics, PACU. All questions were answered the patient agrees with the plan.)  Induction: intravenous. Anesthetic plan and risks discussed with patient. Plan discussed with CRNA. This pre-anesthesia assessment may be used as a history and physical.    DOS STAFF ADDENDUM:    Pt seen and examined, chart reviewed (including anesthesia, drug and allergy history). No interval changes to history and physical examination. Anesthetic plan, risks, benefits, alternatives, and personnel involved discussed with patient. Patient verbalized an understanding and agrees to proceed.       Lisy Patel MD  October 26, 2021  1:20 PM

## 2021-10-26 NOTE — PROGRESS NOTES
Pt to phase 2 stable. Pain tolerable. Taking po. Boyfriend at bedside. Discharge instructions given. Verbalized understanding.

## 2021-10-27 ENCOUNTER — APPOINTMENT (OUTPATIENT)
Dept: GENERAL RADIOLOGY | Age: 21
End: 2021-10-27
Payer: MEDICAID

## 2021-10-27 ENCOUNTER — HOSPITAL ENCOUNTER (EMERGENCY)
Age: 21
Discharge: HOME | End: 2021-10-28
Payer: MEDICAID

## 2021-10-27 VITALS
RESPIRATION RATE: 18 BRPM | OXYGEN SATURATION: 98 % | DIASTOLIC BLOOD PRESSURE: 85 MMHG | HEART RATE: 79 BPM | SYSTOLIC BLOOD PRESSURE: 139 MMHG | TEMPERATURE: 98.2 F

## 2021-10-27 DIAGNOSIS — R07.9 CHEST PAIN, UNSPECIFIED TYPE: ICD-10-CM

## 2021-10-27 DIAGNOSIS — J02.9 ACUTE PHARYNGITIS, UNSPECIFIED ETIOLOGY: Primary | ICD-10-CM

## 2021-10-27 LAB
A/G RATIO: 1.3 (ref 1.1–2.2)
ALBUMIN SERPL-MCNC: 4.3 G/DL (ref 3.4–5)
ALP BLD-CCNC: 80 U/L (ref 40–129)
ALT SERPL-CCNC: 15 U/L (ref 10–40)
ANION GAP SERPL CALCULATED.3IONS-SCNC: 10 MMOL/L (ref 3–16)
AST SERPL-CCNC: 10 U/L (ref 15–37)
BASOPHILS ABSOLUTE: 0.2 K/UL (ref 0–0.2)
BASOPHILS RELATIVE PERCENT: 1.2 %
BILIRUB SERPL-MCNC: <0.2 MG/DL (ref 0–1)
BUN BLDV-MCNC: 17 MG/DL (ref 7–20)
CALCIUM SERPL-MCNC: 9.4 MG/DL (ref 8.3–10.6)
CHLORIDE BLD-SCNC: 101 MMOL/L (ref 99–110)
CO2: 25 MMOL/L (ref 21–32)
CREAT SERPL-MCNC: 0.7 MG/DL (ref 0.6–1.1)
EOSINOPHILS ABSOLUTE: 0.1 K/UL (ref 0–0.6)
EOSINOPHILS RELATIVE PERCENT: 0.8 %
GFR AFRICAN AMERICAN: >60
GFR NON-AFRICAN AMERICAN: >60
GLOBULIN: 3.4 G/DL
GLUCOSE BLD-MCNC: 127 MG/DL (ref 70–99)
HCG QUALITATIVE: NEGATIVE
HCT VFR BLD CALC: 39.4 % (ref 36–48)
HEMOGLOBIN: 12.7 G/DL (ref 12–16)
LIPASE: 22 U/L (ref 13–60)
LYMPHOCYTES ABSOLUTE: 4.6 K/UL (ref 1–5.1)
LYMPHOCYTES RELATIVE PERCENT: 29.6 %
MCH RBC QN AUTO: 25.6 PG (ref 26–34)
MCHC RBC AUTO-ENTMCNC: 32.2 G/DL (ref 31–36)
MCV RBC AUTO: 79.5 FL (ref 80–100)
MONOCYTES ABSOLUTE: 0.8 K/UL (ref 0–1.3)
MONOCYTES RELATIVE PERCENT: 5 %
NEUTROPHILS ABSOLUTE: 9.9 K/UL (ref 1.7–7.7)
NEUTROPHILS RELATIVE PERCENT: 63.4 %
PDW BLD-RTO: 15 % (ref 12.4–15.4)
PLATELET # BLD: 425 K/UL (ref 135–450)
PMV BLD AUTO: 7.6 FL (ref 5–10.5)
POTASSIUM REFLEX MAGNESIUM: 4.2 MMOL/L (ref 3.5–5.1)
RBC # BLD: 4.95 M/UL (ref 4–5.2)
SODIUM BLD-SCNC: 136 MMOL/L (ref 136–145)
TOTAL PROTEIN: 7.7 G/DL (ref 6.4–8.2)
TROPONIN: <0.01 NG/ML
WBC # BLD: 15.7 K/UL (ref 4–11)

## 2021-10-27 PROCEDURE — 87635 SARS-COV-2 COVID-19 AMP PRB: CPT

## 2021-10-27 PROCEDURE — 84703 CHORIONIC GONADOTROPIN ASSAY: CPT

## 2021-10-27 PROCEDURE — 6360000002 HC RX W HCPCS: Performed by: PHYSICIAN ASSISTANT

## 2021-10-27 PROCEDURE — 99283 EMERGENCY DEPT VISIT LOW MDM: CPT

## 2021-10-27 PROCEDURE — 93005 ELECTROCARDIOGRAM TRACING: CPT | Performed by: EMERGENCY MEDICINE

## 2021-10-27 PROCEDURE — 83690 ASSAY OF LIPASE: CPT

## 2021-10-27 PROCEDURE — 96372 THER/PROPH/DIAG INJ SC/IM: CPT

## 2021-10-27 PROCEDURE — 85025 COMPLETE CBC W/AUTO DIFF WBC: CPT

## 2021-10-27 PROCEDURE — 84484 ASSAY OF TROPONIN QUANT: CPT

## 2021-10-27 PROCEDURE — 71046 X-RAY EXAM CHEST 2 VIEWS: CPT

## 2021-10-27 PROCEDURE — 80053 COMPREHEN METABOLIC PANEL: CPT

## 2021-10-27 RX ORDER — KETOROLAC TROMETHAMINE 30 MG/ML
30 INJECTION, SOLUTION INTRAMUSCULAR; INTRAVENOUS ONCE
Status: DISCONTINUED | OUTPATIENT
Start: 2021-10-27 | End: 2021-10-27

## 2021-10-27 RX ORDER — KETOROLAC TROMETHAMINE 30 MG/ML
30 INJECTION, SOLUTION INTRAMUSCULAR; INTRAVENOUS ONCE
Status: COMPLETED | OUTPATIENT
Start: 2021-10-27 | End: 2021-10-27

## 2021-10-27 RX ADMIN — KETOROLAC TROMETHAMINE 30 MG: 30 INJECTION, SOLUTION INTRAMUSCULAR at 23:17

## 2021-10-27 ASSESSMENT — PAIN SCALES - GENERAL: PAINLEVEL_OUTOF10: 8

## 2021-10-27 ASSESSMENT — ENCOUNTER SYMPTOMS
TROUBLE SWALLOWING: 0
COUGH: 0
VOMITING: 0
SORE THROAT: 1
ABDOMINAL PAIN: 0
NAUSEA: 0
SHORTNESS OF BREATH: 0
COLOR CHANGE: 0

## 2021-10-27 NOTE — Clinical Note
Janet León was seen and treated in our emergency department on 10/27/2021. She may return to work on 10/30/2021. If you have any questions or concerns, please don't hesitate to call.       AZRA Salamanca

## 2021-10-28 LAB
EKG ATRIAL RATE: 74 BPM
EKG DIAGNOSIS: NORMAL
EKG P AXIS: 38 DEGREES
EKG P-R INTERVAL: 142 MS
EKG Q-T INTERVAL: 370 MS
EKG QRS DURATION: 94 MS
EKG QTC CALCULATION (BAZETT): 410 MS
EKG R AXIS: -8 DEGREES
EKG T AXIS: 15 DEGREES
EKG VENTRICULAR RATE: 74 BPM
SARS-COV-2, NAAT: NOT DETECTED

## 2021-10-28 PROCEDURE — 93010 ELECTROCARDIOGRAM REPORT: CPT | Performed by: INTERNAL MEDICINE

## 2021-10-28 NOTE — ED PROVIDER NOTES
I was asked for an EKG interpretation. Otherwise, I did not evaluate the patient. I was available for consultation. EKG  The Ekg interpreted by me in the absence of a cardiologist shows. normal sinus rhythm with a rate of 74  Axis is   Left axis deviation  QTc is  normal  Intervals and Durations are unremarkable. No specific ST-T wave changes appreciated. No evidence of acute ischemia.    No significant change from prior EKG dated 10/20/21       Terrance Prater MD  10/27/21 5765

## 2021-11-05 ENCOUNTER — TELEPHONE (OUTPATIENT)
Dept: PRIMARY CARE CLINIC | Age: 21
End: 2021-11-05

## 2021-11-29 ENCOUNTER — APPOINTMENT (OUTPATIENT)
Dept: GENERAL RADIOLOGY | Age: 21
End: 2021-11-29
Payer: MEDICAID

## 2021-11-29 ENCOUNTER — HOSPITAL ENCOUNTER (EMERGENCY)
Age: 21
Discharge: HOME | End: 2021-11-29
Attending: STUDENT IN AN ORGANIZED HEALTH CARE EDUCATION/TRAINING PROGRAM
Payer: MEDICAID

## 2021-11-29 VITALS
DIASTOLIC BLOOD PRESSURE: 86 MMHG | TEMPERATURE: 98.1 F | RESPIRATION RATE: 20 BRPM | OXYGEN SATURATION: 97 % | WEIGHT: 293 LBS | BODY MASS INDEX: 57.52 KG/M2 | HEIGHT: 60 IN | HEART RATE: 83 BPM | SYSTOLIC BLOOD PRESSURE: 128 MMHG

## 2021-11-29 DIAGNOSIS — S39.012A STRAIN OF LUMBAR REGION, INITIAL ENCOUNTER: Primary | ICD-10-CM

## 2021-11-29 PROCEDURE — 99283 EMERGENCY DEPT VISIT LOW MDM: CPT

## 2021-11-29 PROCEDURE — 6360000002 HC RX W HCPCS: Performed by: STUDENT IN AN ORGANIZED HEALTH CARE EDUCATION/TRAINING PROGRAM

## 2021-11-29 PROCEDURE — 72100 X-RAY EXAM L-S SPINE 2/3 VWS: CPT

## 2021-11-29 PROCEDURE — 96372 THER/PROPH/DIAG INJ SC/IM: CPT

## 2021-11-29 PROCEDURE — 6370000000 HC RX 637 (ALT 250 FOR IP): Performed by: STUDENT IN AN ORGANIZED HEALTH CARE EDUCATION/TRAINING PROGRAM

## 2021-11-29 RX ORDER — METHOCARBAMOL 750 MG/1
1500 TABLET, FILM COATED ORAL ONCE
Status: COMPLETED | OUTPATIENT
Start: 2021-11-29 | End: 2021-11-29

## 2021-11-29 RX ORDER — LIDOCAINE 4 G/G
1 PATCH TOPICAL ONCE
Status: DISCONTINUED | OUTPATIENT
Start: 2021-11-29 | End: 2021-11-29 | Stop reason: HOSPADM

## 2021-11-29 RX ORDER — LIDOCAINE 50 MG/G
1 PATCH TOPICAL DAILY
Qty: 10 PATCH | Refills: 0 | Status: SHIPPED | OUTPATIENT
Start: 2021-11-29 | End: 2021-12-09

## 2021-11-29 RX ORDER — KETOROLAC TROMETHAMINE 30 MG/ML
15 INJECTION, SOLUTION INTRAMUSCULAR; INTRAVENOUS ONCE
Status: COMPLETED | OUTPATIENT
Start: 2021-11-29 | End: 2021-11-29

## 2021-11-29 RX ORDER — METHOCARBAMOL 500 MG/1
500 TABLET, FILM COATED ORAL 4 TIMES DAILY PRN
Qty: 20 TABLET | Refills: 0 | Status: SHIPPED | OUTPATIENT
Start: 2021-11-29 | End: 2021-12-04

## 2021-11-29 RX ADMIN — KETOROLAC TROMETHAMINE 15 MG: 30 INJECTION, SOLUTION INTRAMUSCULAR; INTRAVENOUS at 14:54

## 2021-11-29 RX ADMIN — METHOCARBAMOL TABLETS 1500 MG: 750 TABLET, COATED ORAL at 14:54

## 2021-11-29 ASSESSMENT — ENCOUNTER SYMPTOMS
BACK PAIN: 1
SHORTNESS OF BREATH: 0
ABDOMINAL PAIN: 0

## 2021-11-29 ASSESSMENT — PAIN SCALES - GENERAL
PAINLEVEL_OUTOF10: 10
PAINLEVEL_OUTOF10: 10

## 2021-11-29 NOTE — ED PROVIDER NOTES
[address] [facility]  ED  Emergency Department Encounter  EmergencyMedicine Resident     Pt Delmy Andino  MRN: [id number]  Birthdate 2000  Date of evaluation: 11/29/21  PCP:  Kristen Granados, [facility]       Chief Complaint   Patient presents with    Back Pain     Pt reports lower back pain after walking from one room to another and it \"gave out\" pt was unambulatory on scene. HISTORY OF PRESENT ILLNESS  (Location/Symptom, Timing/Onset, Context/Setting, Quality, Duration, Modifying Factors, Severity.)      Junior Simons is a 24 y.o. female who presents with acute back pain. Patient reports a little over 2 hours ago she was walking back from the restroom when all of a sudden she had extreme sharp pain in the middle of her back. She called out for her friend who helped lower her to the ground. She lay face down on the ground for approximately 30 minutes but the pain did not let up called EMS who brought her here. Rates pain 10 out of 10 and states it is not improved any since it first began. She did not take any medications or try heat or ice. She denies any numbness or tingling or pain extending into her legs, loss of sensation, loss of motor function, saddle paresthesia, bladder or bowel incontinence. She denies any history of chronic back pain and does not take any medications for back pain. She helps take care of her niece and nephew for living and so moves around frequently and picking things up but this does not tend to aggravate her back. She does report one incidence of back pain that took her to the hospital middle school after she cracked her back wrong but no other incidences.     PAST MEDICAL / SURGICAL / SOCIAL / FAMILY HISTORY      has a past medical history of Attention deficit disorder with hyperactivity(314.01), JEANNE (generalized anxiety disorder), GERD (gastroesophageal reflux disease), Gonorrhea, Hypothyroid, PTSD (post-traumatic stress disorder), and Severe recurrent major depression with psychotic features ([facility] [address] has a past surgical history that includes Tympanostomy tube placement; Tonsillectomy; myringotomy (Bilateral, 10/26/2021); and skin biopsy (Right, 10/26/2021). Social History     Socioeconomic History    Marital status: Single     Spouse name: Not on file    Number of children: Not on file    Years of education: Not on file    Highest education level: Not on file   Occupational History    Occupation: [employer]   Tobacco Use    Smoking status: Current Every Day Smoker     Packs/day: 1.00    Smokeless tobacco: Never Used   Vaping Use    Vaping Use: Never used   Substance and Sexual Activity    Alcohol use: Yes     Comment: 1-2 drinks per month    Drug use: No    Sexual activity: Yes     Partners: Male   Other Topics Concern    Not on file   Social History Narrative    Not on file     Social Determinants of Health     Financial Resource Strain:     Difficulty of Paying Living Expenses: Not on file   Food Insecurity:     Worried About Running Out of Food in the Last Year: Not on file    Tejinder of Food in the Last Year: Not on file   Transportation Needs:     Lack of Transportation (Medical): Not on file    Lack of Transportation (Non-Medical):  Not on file   Physical Activity:     Days of Exercise per Week: Not on file    Minutes of Exercise per Session: Not on file   Stress:     Feeling of Stress : Not on file   Social Connections:     Frequency of Communication with Friends and Family: Not on file    Frequency of Social Gatherings with Friends and Family: Not on file    Attends Bahai Services: Not on file    Active Member of Clubs or Organizations: Not on file    Attends Club or Organization Meetings: Not on file    Marital Status: Not on file   Intimate Partner Violence:     Fear of Current or Ex-Partner: Not on file    Emotionally Abused: Not on file    Physically Abused: Not on file    Sexually Abused: Not on file   Housing Stability:     Unable to Pay for Housing in the Last Year: Not on file    Number of Places Lived in the Last Year: Not on file    Unstable Housing in the Last Year: Not on file       Family History   Problem Relation Age of Onset    Diabetes Mother        Allergies:  Augmentin [amoxicillin-pot clavulanate]    Home Medications:  Prior to Admission medications    Medication Sig Start Date End Date Taking? Authorizing Provider   methocarbamol (ROBAXIN) 500 MG tablet Take 1 tablet by mouth 4 times daily as needed (muscle pain/spasm) 11/29/21 12/4/21 Yes Clint Carmen MD   lidocaine (LIDODERM) 5 % Place 1 patch onto the skin daily for 10 days 12 hours on, 12 hours off. 11/29/21 12/9/21 Yes Clint Carmen MD   naproxen (NAPROSYN) 500 MG tablet Take 1 tablet by mouth 2 times daily (with meals) 10/21/21   Verna Tripp, DO       REVIEW OF SYSTEMS    (2-9 systems for level 4, 10 or more for level 5)      Review of Systems   Constitutional: Negative for chills and fever. Respiratory: Negative for shortness of breath. Cardiovascular: Negative for chest pain. Gastrointestinal: Negative for abdominal pain. No bowel incontinence   Genitourinary:        No bladder incontinence   Musculoskeletal: Positive for back pain. Negative for neck pain and neck stiffness. Neurological: Negative for weakness and numbness. Psychiatric/Behavioral: The patient is nervous/anxious. PHYSICAL EXAM   (up to 7 for level 4, 8 or more for level 5)      INITIAL VITALS:   /86   Pulse 83   Temp 98.1 °F (36.7 °C) (Oral)   Resp 20   Ht 5' (1.524 m)   Wt (!) 325 lb (147.4 kg)   LMP  (Within Months)   SpO2 97%   BMI 63.47 kg/m²     Physical Exam  Constitutional:       Appearance: She is obese. HENT:      Head: Normocephalic and atraumatic. Eyes:      Extraocular Movements: Extraocular movements intact.    Neck:      Comments: Pain in back exacerbated with flexion of neck  Cardiovascular: fracture, disc herniation, cauda equina     DIAGNOSTIC RESULTS / EMERGENCY DEPARTMENT COURSE / MDM   :  No results found for this visit on 11/29/21. IMPRESSION:     RADIOLOGY:  Lumbar XR read by radiology: no plain film evidence of acute fracture     EKG  None    All EKG's are interpreted by the Emergency Department Physician who either signs or Co-signs this chart in the absence of a cardiologist.    EMERGENCY DEPARTMENT COURSE:  This is a 74-year-old female with history of JEANNE, PTSD, severe recurrent major depression who presents to the ED with acute back pain. She is afebrile and normotensive but is tearful. Reports she was walking back from the bathroom well since she had sudden excruciating pain in her mid back. She is able to lower self to the ground and laid there for 30 minutes before calling EMS. Rates pain 10 out of 10. Denies radiation of pain into legs, numbness, tingling, loss of motor function, saddle paresthesia, bowel or bladder incontinence. She reports 1 incident of back pain that required her to go to the emergency room in middle school but no other history of back pain. On exam, patient is tender to palpation lower thoracic and upper lumbar region and any movement of extremities exacerbates pain. However, sensation, DTRs, and strength appear to be intact. I am not concerned for disc herniation or cauda equina based on history and exam.     Patient was given IM Toradol, Robaxin, and lidocaine patch for pain. Lumbar x-ray was done to rule out any acute bony processes and was negative for acute fracture. On reassessment after medications patient is istt reports pain is now 4 out of 10 and bearable. She feels comfortable going home. PROCEDURES:  None    CONSULTS:  None    CRITICAL CARE:  None    FINAL IMPRESSION      1.  Strain of lumbar region, initial encounter          DISPOSITION / [address]    Discharged to home with 5% lidocaine patch 12 on-12 off for 10 days and robaxin 500 mg 1 tab q 4 hrs prn for pain/spasm. Encouraged patient to f/u with PCP so they can reassess and refer her to PT for stretching and strengthening. She can return to ED if symptoms worsen. PATIENT REFERRED TO:  Fe Mendez DO  [address] [facility]  [address]  [phone]    Schedule an appointment as soon as possible for a visit       [facility]  ED  [address] [facility] [address]  Go to   If symptoms worsen      DISCHARGE MEDICATIONS:  New Prescriptions    LIDOCAINE (LIDODERM) 5 %    Place 1 patch onto the skin daily for 10 days 12 hours on, 12 hours off.     METHOCARBAMOL (ROBAXIN) 500 MG TABLET    Take 1 tablet by mouth 4 times daily as needed (muscle pain/spasm)       Melvin Holland DO  Family Medicine Resident, PGY-1    (Please note that portions of this note were completed with a voice recognition program.  Efforts were made to edit the dictations but occasionally words are mis-transcribed.)        Adeola Forrester DO  Resident  11/29/21 1378

## 2021-12-15 ENCOUNTER — CLINICAL DOCUMENTATION (OUTPATIENT)
Dept: OTHER | Age: 21
End: 2021-12-15

## 2022-01-03 ENCOUNTER — PATIENT MESSAGE (OUTPATIENT)
Dept: PRIMARY CARE CLINIC | Age: 22
End: 2022-01-03

## 2022-01-03 DIAGNOSIS — B85.2 LICE: Primary | ICD-10-CM

## 2022-01-04 RX ORDER — PERMETHRIN 50 MG/G
CREAM TOPICAL
Qty: 120 G | Refills: 0 | Status: SHIPPED | OUTPATIENT
Start: 2022-01-04 | End: 2022-05-09

## 2022-05-09 ENCOUNTER — OFFICE VISIT (OUTPATIENT)
Dept: PRIMARY CARE CLINIC | Age: 22
End: 2022-05-09
Payer: MEDICAID

## 2022-05-09 VITALS
DIASTOLIC BLOOD PRESSURE: 76 MMHG | HEART RATE: 90 BPM | TEMPERATURE: 97 F | SYSTOLIC BLOOD PRESSURE: 119 MMHG | BODY MASS INDEX: 57.52 KG/M2 | WEIGHT: 293 LBS | HEIGHT: 60 IN

## 2022-05-09 DIAGNOSIS — N12 PYELONEPHRITIS: ICD-10-CM

## 2022-05-09 DIAGNOSIS — E66.01 MORBID OBESITY (HCC): Primary | ICD-10-CM

## 2022-05-09 DIAGNOSIS — M54.50 CHRONIC BILATERAL LOW BACK PAIN WITHOUT SCIATICA: ICD-10-CM

## 2022-05-09 DIAGNOSIS — G89.29 CHRONIC BILATERAL LOW BACK PAIN WITHOUT SCIATICA: ICD-10-CM

## 2022-05-09 DIAGNOSIS — M79.89 SWELLING OF BOTH LOWER EXTREMITIES: ICD-10-CM

## 2022-05-09 DIAGNOSIS — G56.03 BILATERAL CARPAL TUNNEL SYNDROME: ICD-10-CM

## 2022-05-09 PROCEDURE — G8427 DOCREV CUR MEDS BY ELIG CLIN: HCPCS | Performed by: STUDENT IN AN ORGANIZED HEALTH CARE EDUCATION/TRAINING PROGRAM

## 2022-05-09 PROCEDURE — 99214 OFFICE O/P EST MOD 30 MIN: CPT | Performed by: STUDENT IN AN ORGANIZED HEALTH CARE EDUCATION/TRAINING PROGRAM

## 2022-05-09 PROCEDURE — G8417 CALC BMI ABV UP PARAM F/U: HCPCS | Performed by: STUDENT IN AN ORGANIZED HEALTH CARE EDUCATION/TRAINING PROGRAM

## 2022-05-09 PROCEDURE — 4004F PT TOBACCO SCREEN RCVD TLK: CPT | Performed by: STUDENT IN AN ORGANIZED HEALTH CARE EDUCATION/TRAINING PROGRAM

## 2022-05-09 RX ORDER — NAPROXEN 500 MG/1
500 TABLET ORAL 2 TIMES DAILY WITH MEALS
Qty: 60 TABLET | Refills: 0 | Status: SHIPPED | OUTPATIENT
Start: 2022-05-09 | End: 2022-09-08

## 2022-05-09 ASSESSMENT — ENCOUNTER SYMPTOMS
NAUSEA: 0
SHORTNESS OF BREATH: 0
CONSTIPATION: 0
DIARRHEA: 0
BACK PAIN: 1
WHEEZING: 0

## 2022-05-09 NOTE — PROGRESS NOTES
[facility]  2022    Carol Cabrera (:  2000) is a 24 y.o. female, here for evaluation of the following medical concerns:    Chief Complaint   Patient presents with    Back Pain    Foot Swelling    Other     right hand gets numb         ASSESSMENT/ PLAN  1. Morbid obesity (HCC)  Uncontrolled, BMI 64. I do think her back pain, carpal tunnel and venous stasis is related to her weight. Declines interventions for weight loss today; would benefit from referral to medical weight loss clinic. 2. Chronic bilateral low back pain without sciatica  Uncontrolled, secondary to musculoskeletal strain. Lumbar x-ray reviewed and unremarkable. Referral for physical therapy, initiate naproxen. Follow-up if persistent or worsening-would consider advanced imaging at that time. - naproxen (NAPROSYN) 500 MG tablet; Take 1 tablet by mouth 2 times daily (with meals)  Dispense: 60 tablet; Refill: 0  - Ambulatory referral to Physical Therapy    3. Bilateral carpal tunnel syndrome  Uncontrolled, secondary to obesity and repetitive motions at work. Provided home physical therapy and recommended nightly bracing. Provided link to order brace from [place]. Consider EMG and referral to hand surgery if persistent or worsening. TSH previously checked and within normal limits. 4. Swelling of both lower extremities  Uncontrolled, this is a new problem. Secondary to obesity, diet heavy in sodium, lack of exercise on prolonged standing at work. No evidence of DVT. Recommend elevation, compression, weight loss and decreasing sodium in diet. Continue to monitor. 5. Pyelonephritis  Diagnosed at urgent care yesterday, no reports available for review. Patient has antibiotics ready for her at the pharmacy, encouraged her to pick them up immediately and initiate treatment.   Follow-up if fever, chills, nausea, vomiting or worsening flank pain, dysuria and hematuria     Return in about 4 weeks (around 6/6/2022) for low back pain, carpal tunnel, venous stasis . HPI  Presents today with concerns of back pain, flank pain, lower extremity swelling, hand numbness and tingling. Patient has a history of low back pain, she was previously seen in the emergency department in November 2021 for this same problem. Per ED report:    Evelyn Malik is a 24 y.o. female who presents with acute back pain. Patient reports a little over 2 hours ago she was walking back from the restroom when all of a sudden she had extreme sharp pain in the middle of her back. She called out for her friend who helped lower her to the ground. She lay face down on the ground for approximately 30 minutes but the pain did not let up called EMS who brought her here. Rates pain 10 out of 10 and states it is not improved any since it first began. She did not take any medications or try heat or ice. She denies any numbness or tingling or pain extending into her legs, loss of sensation, loss of motor function, saddle paresthesia, bladder or bowel incontinence. She denies any history of chronic back pain and does not take any medications for back pain. She helps take care of her niece and nephew for living and so moves around frequently and picking things up but this does not tend to aggravate her back. She does report one incidence of back pain that took her to the [facility] middle [facility] after she cracked her back wrong but no other incidences. A lumbar spine x-ray was performed which was unremarkable for fracture. It was recommended that she initiate anti-inflammatory medication, and follow-up with physical therapy. She has not followed up with physical therapy. No new injuries. She is back to work at [employer] full-time, and states that she is on her feet all day which exacerbates the pain. Patient states that she went to urgent care yesterday for dysuria and flank pain.   She was diagnosed with pyelonephritis and prescribed antibiotics. She has not picked up the antibiotics to start taking them. There are no reports available for me to review. No fever, chills, nausea or vomiting. Patient performs repetitive motions at work, endorses bilateral hand numbness and tingling. This is concentrated in her fourth and fifth digits bilaterally. She has never been diagnosed with carpal tunnel syndrome. Symptoms are worse after work and when she is texting. No  strength weakness. The symptoms do not radiate. Since starting back at work full-time, patient notes that her bilateral lower extremity swelling at the end of the day. She endorses an unhealthy diet, filled with fast food. She does not elevate her feet or use compression stockings. No pain in her calves, redness or warmth of her lower extremity. No recent hospitalization, hypercoagulability. ROS  Review of Systems   Constitutional: Negative for activity change, fever and unexpected weight change. HENT: Negative for tinnitus. Eyes: Negative for visual disturbance. Respiratory: Negative for shortness of breath and wheezing. Cardiovascular: Positive for leg swelling. Negative for chest pain and palpitations. Gastrointestinal: Negative for constipation, diarrhea and nausea. Genitourinary: Positive for dysuria and flank pain. Negative for decreased urine volume and difficulty urinating. Musculoskeletal: Positive for back pain. Negative for gait problem, neck pain and neck stiffness. Neurological: Positive for numbness. Negative for syncope, weakness, light-headedness and headaches. HISTORIES  Current Outpatient Medications on File Prior to Visit   Medication Sig Dispense Refill    permethrin (ELIMITE) 5 % cream Apply topically as directed (Patient not taking: Reported on 5/9/2022) 120 g 0     No current facility-administered medications on file prior to visit.       Past Medical History:   Diagnosis Date    Attention deficit disorder with hyperactivity(314.01)     JEANNE (generalized anxiety disorder)     GERD (gastroesophageal reflux disease)     Gonorrhea 10/18/2020    Hypothyroid     PTSD (post-traumatic stress disorder)     Severe recurrent major depression with psychotic features ([facility]      Patient Active Problem List   Diagnosis    GERD (gastroesophageal reflux disease)    Morbid obesity ([facility]    Attention deficit hyperactivity disorder (ADHD)    PTSD (post-traumatic stress disorder)    JEANNE (generalized anxiety disorder)    Severe recurrent major depression with psychotic features (HCC)    Current smoker    Chronic bilateral low back pain without sciatica    Bilateral carpal tunnel syndrome       PE  Vitals:    05/09/22 1346   BP: 119/76   Site: Left Lower Arm   Position: Sitting   Cuff Size: Large Adult   Pulse: 90   Temp: 97 °F (36.1 °C)   TempSrc: Temporal   Weight: (!) 331 lb 9.6 oz (150.4 kg)   Height: 5' (1.524 m)     Estimated body mass index is 64.76 kg/m² as calculated from the following:    Height as of this encounter: 5' (1.524 m). Weight as of this encounter: 331 lb 9.6 oz (150.4 kg). Physical Exam  Vitals reviewed. Constitutional:       General: She is not in acute distress. Appearance: Normal appearance. She is obese. HENT:      Head: Normocephalic and atraumatic. Cardiovascular:      Rate and Rhythm: Normal rate and regular rhythm. Pulses: Normal pulses. Heart sounds: Normal heart sounds. Pulmonary:      Effort: Pulmonary effort is normal.      Breath sounds: Normal breath sounds. Abdominal:      General: Abdomen is flat. Bowel sounds are normal. There is no distension. Tenderness: There is no abdominal tenderness. There is right CVA tenderness. There is no left CVA tenderness or guarding. Musculoskeletal:         General: Tenderness present. No deformity or signs of injury. Normal range of motion. Cervical back: Normal range of motion. Right lower leg: No edema. Left lower leg: No edema. Comments: Bilateral tenderness T12-L2. Nonantalgic gait. Lower extremity strength and sensation intact. Per Tinel's and Phalen's.  strength intact bilaterally. Skin:     General: Skin is warm and dry. Capillary Refill: Capillary refill takes less than 2 seconds. Neurological:      General: No focal deficit present. Mental Status: She is alert. Sensory: No sensory deficit. Motor: No weakness. Gait: Gait normal.   Psychiatric:         Mood and Affect: Mood normal.         Behavior: Behavior normal.         Damián Smith DO    This dictation was generated by voice recognition computer software. Although all attempts are made to edit the dictation for accuracy, there may be errors in the transcription that are not intended.

## 2022-05-09 NOTE — PATIENT INSTRUCTIONS
Patient Education        Carpal Tunnel Syndrome: Exercises  Introduction  Here are some examples of exercises for you to try. The exercises may be suggested for a condition or for rehabilitation. Start each exercise slowly. Ease off the exercises if you start to have pain. You will be told when to start these exercises and which ones will work bestfor you. Warm-up stretches  When you no longer have pain or numbness, you can do exercises to help prevent carpal tunnel syndrome from coming back. Do not do any stretch or movement thatis uncomfortable or painful. 1. Rotate your wrist up, down, and from side to side. Repeat 4 times. 2. Stretch your fingers far apart. Relax them, and then stretch them again. Repeat 4 times. 3. Stretch your thumb by pulling it back gently, holding it, and then releasing it. Repeat 4 times. How to do the exercises  Prayer stretch    1. Start with your palms together in front of your chest just below your chin. 2. Slowly lower your hands toward your waistline, keeping your hands close to your stomach and your palms together until you feel a mild to moderate stretch under your forearms. 3. Hold for at least 15 to 30 seconds. Repeat 2 to 4 times. Wrist flexor stretch    1. Extend your arm in front of you with your palm up. 2. Bend your wrist, pointing your hand toward the floor. 3. With your other hand, gently bend your wrist farther until you feel a mild to moderate stretch in your forearm. 4. Hold for at least 15 to 30 seconds. Repeat 2 to 4 times. Wrist extensor stretch    1. Repeat steps 1 through 4 of the stretch above, but begin with your extended hand palm down. Follow-up care is a key part of your treatment and safety. Be sure to make and go to all appointments, and call your doctor if you are having problems. It's also a good idea to know your test results and keep alist of the medicines you take. Where can you learn more? Go to https://vinny.Zady. org and sign in to your [email or web address] account. Enter A506 in the [facility] box to learn more about \"Carpal Tunnel Syndrome: Exercises. \"     If you do not have an account, please click on the \"Sign Up Now\" link. Current as of: July 1, 2021               Content Version: 13.2  © 2006-2022 [employer]. Care instructions adapted under license by [facility] [facility] If you have questions about a medical condition or this instruction, always ask your healthcare professional. [place] [address] any warranty or liability for your use of this information. Patient Education        Leg and Ankle Edema: Care Instructions  Your Care Instructions  Swelling in the legs, ankles, and feet is called edema. It is common after you sit or stand for a while. Long plane flights or car rides often cause swelling in the legs and feet. You may also have swelling if you have to stand for long periods of time at your job. Problems with the veins in the legs (varicose veins) and changes in hormones can also cause swelling. Sometimes the swelling in the ankles and feet is caused by a more serious problem, such as heartfailure, infection, blood clots, or liver or kidney disease. Follow-up care is a key part of your treatment and safety. Be sure to make and go to all appointments, and call your doctor if you are having problems. It's also a good idea to know your test results and keep alist of the medicines you take. How can you care for yourself at home?  If your doctor gave you medicine, take it as prescribed. Call your doctor if you think you are having a problem with your medicine.  Whenever you are resting, raise your legs up. Try to keep the swollen area higher than the level of your heart.  Take breaks from standing or sitting in one position. ? Walk around to increase the blood flow in your lower legs.   ? Move your feet and ankles often while you stand, or tighten and relax your leg muscles.  Wear support stockings. Put them on in the morning, before swelling gets worse.  Eat a balanced diet. Lose weight if you need to.  Limit the amount of salt (sodium) in your diet. Salt holds fluid in the body and may increase swelling. When should you call for help? Call 911 anytime you think you may need emergency care. For example, call if:     You have symptoms of a blood clot in your lung (called a pulmonary embolism). These may include:  ? Sudden chest pain. ? Trouble breathing. ? Coughing up blood. Call your doctor now or seek immediate medical care if:     You have signs of a blood clot, such as:  ? Pain in your calf, back of the knee, thigh, or groin. ? Redness and swelling in your leg or groin.      You have symptoms of infection, such as:  ? Increased pain, swelling, warmth, or redness. ? Red streaks or pus. ? A fever. Watch closely for changes in your health, and be sure to contact your doctor if:     Your swelling is getting worse.      You have new or worsening pain in your legs.      You do not get better as expected. Where can you learn more? Go to https://Arisdyne SystemspeGenAudio.Nascentric. org and sign in to your rVue account. Enter D443 in the [facility] box to learn more about \"Leg and Ankle Edema: Care Instructions. \"     If you do not have an account, please click on the \"Sign Up Now\" link. Current as of: July 1, 2021               Content Version: 13.2  © 2006-2022 [employer]. Care instructions adapted under license by [facility] ([facility]). If you have questions about a medical condition or this instruction, always ask your healthcare professional. [place] [address] any warranty or liability for your use of this information.

## 2022-05-09 NOTE — LETTER
[facility]  [address] [place] [id number]  Phone: [phone]  Fax: [address]ard,         May 9, 2022     Patient: Marisa Orr   YOB: 2000   Date of Visit: 5/9/2022       To Whom It May Concern:    Dina Kimberly was seen and examined in my office today. If you have any questions or concerns, please don't hesitate to call.     Sincerely,        Earnest Waddell, DO

## 2022-06-09 ENCOUNTER — HOSPITAL ENCOUNTER (OUTPATIENT)
Dept: PHYSICAL THERAPY | Age: 22
Setting detail: THERAPIES SERIES
Discharge: HOME | End: 2022-06-09

## 2022-06-09 NOTE — PROGRESS NOTES
Patient is no-show, no-call for PT evaluation today.   This appointment was confirmed with patient on 06 08 22

## 2022-09-08 ENCOUNTER — HOSPITAL ENCOUNTER (EMERGENCY)
Age: 22
Discharge: HOME | End: 2022-09-08
Attending: EMERGENCY MEDICINE
Payer: MEDICAID

## 2022-09-08 VITALS
BODY MASS INDEX: 53.92 KG/M2 | HEIGHT: 62 IN | DIASTOLIC BLOOD PRESSURE: 74 MMHG | HEART RATE: 88 BPM | TEMPERATURE: 98.8 F | OXYGEN SATURATION: 97 % | SYSTOLIC BLOOD PRESSURE: 111 MMHG | RESPIRATION RATE: 18 BRPM | WEIGHT: 293 LBS

## 2022-09-08 DIAGNOSIS — L03.811 CELLULITIS OF SCALP: Primary | ICD-10-CM

## 2022-09-08 PROCEDURE — 6370000000 HC RX 637 (ALT 250 FOR IP): Performed by: EMERGENCY MEDICINE

## 2022-09-08 PROCEDURE — 99283 EMERGENCY DEPT VISIT LOW MDM: CPT

## 2022-09-08 RX ORDER — CEPHALEXIN 500 MG/1
500 CAPSULE ORAL 4 TIMES DAILY
Qty: 28 CAPSULE | Refills: 0 | Status: SHIPPED | OUTPATIENT
Start: 2022-09-08 | End: 2022-09-15

## 2022-09-08 RX ORDER — ONDANSETRON 4 MG/1
4 TABLET, ORALLY DISINTEGRATING ORAL EVERY 8 HOURS PRN
Qty: 10 TABLET | Refills: 0 | Status: SHIPPED | OUTPATIENT
Start: 2022-09-08

## 2022-09-08 RX ORDER — CEPHALEXIN 500 MG/1
500 CAPSULE ORAL ONCE
Status: COMPLETED | OUTPATIENT
Start: 2022-09-08 | End: 2022-09-08

## 2022-09-08 RX ORDER — SULFAMETHOXAZOLE AND TRIMETHOPRIM 800; 160 MG/1; MG/1
1 TABLET ORAL ONCE
Status: COMPLETED | OUTPATIENT
Start: 2022-09-08 | End: 2022-09-08

## 2022-09-08 RX ORDER — ACETAMINOPHEN 500 MG
1000 TABLET ORAL ONCE
Status: COMPLETED | OUTPATIENT
Start: 2022-09-08 | End: 2022-09-08

## 2022-09-08 RX ORDER — SULFAMETHOXAZOLE AND TRIMETHOPRIM 800; 160 MG/1; MG/1
1 TABLET ORAL 2 TIMES DAILY
Qty: 14 TABLET | Refills: 0 | Status: SHIPPED | OUTPATIENT
Start: 2022-09-08 | End: 2022-09-15

## 2022-09-08 RX ADMIN — CEPHALEXIN 500 MG: 500 CAPSULE ORAL at 03:19

## 2022-09-08 RX ADMIN — ACETAMINOPHEN 1000 MG: 500 TABLET ORAL at 03:19

## 2022-09-08 RX ADMIN — SULFAMETHOXAZOLE AND TRIMETHOPRIM 1 TABLET: 800; 160 TABLET ORAL at 03:19

## 2022-09-08 ASSESSMENT — PAIN - FUNCTIONAL ASSESSMENT
PAIN_FUNCTIONAL_ASSESSMENT: ACTIVITIES ARE NOT PREVENTED
PAIN_FUNCTIONAL_ASSESSMENT: 0-10

## 2022-09-08 ASSESSMENT — PAIN DESCRIPTION - ORIENTATION: ORIENTATION: LEFT

## 2022-09-08 ASSESSMENT — PAIN DESCRIPTION - DESCRIPTORS: DESCRIPTORS: THROBBING;SHARP

## 2022-09-08 ASSESSMENT — PAIN DESCRIPTION - LOCATION: LOCATION: HEAD

## 2022-09-08 ASSESSMENT — PAIN DESCRIPTION - PAIN TYPE: TYPE: ACUTE PAIN

## 2022-09-08 ASSESSMENT — PAIN DESCRIPTION - FREQUENCY: FREQUENCY: CONTINUOUS

## 2022-09-08 ASSESSMENT — PAIN SCALES - GENERAL: PAINLEVEL_OUTOF10: 8

## 2022-09-08 NOTE — DISCHARGE INSTRUCTIONS
If not improving on antibiotics in 24 to 48 hours, get this rechecked, it does not look typical of shingles at this time, but due to your description of pain, I want you to get this looked at again, you would need to start treatment for that within 72 hours of onset. Return to ER if any fevers, severe headache, neck stiffness, worsening redness/swelling, or any other concerns. You declined concern for pregnancy today.

## 2022-09-08 NOTE — ED PROVIDER NOTES
Highest education level: Not on file   Occupational History    Occupation: [employer]   Tobacco Use    Smoking status: Former     Packs/day: 1.00     Types: Cigarettes    Smokeless tobacco: Never   Vaping Use    Vaping Use: Every day    Substances: Nicotine   Substance and Sexual Activity    Alcohol use: Yes     Comment: 1-2 drinks per month    Drug use: No    Sexual activity: Yes     Partners: Male   Other Topics Concern    Not on file   Social History Narrative    Not on file     Social Determinants of Health     Financial Resource Strain: Not on file   Food Insecurity: Not on file   Transportation Needs: Not on file   Physical Activity: Not on file   Stress: Not on file   Social Connections: Not on file   Intimate Partner Violence: Not on file   Housing Stability: Not on file     No current facility-administered medications for this encounter. Current Outpatient Medications   Medication Sig Dispense Refill    ondansetron (ZOFRAN ODT) 4 MG disintegrating tablet Take 1 tablet by mouth every 8 hours as needed for Nausea or Vomiting 10 tablet 0    cephALEXin (KEFLEX) 500 MG capsule Take 1 capsule by mouth 4 times daily for 7 days 28 capsule 0    sulfamethoxazole-trimethoprim (BACTRIM DS) 800-160 MG per tablet Take 1 tablet by mouth 2 times daily for 7 days 14 tablet 0     Allergies   Allergen Reactions    Augmentin [Amoxicillin-Pot Clavulanate] Other (See Comments)     unknown       REVIEW OF SYSTEMS  10 systems reviewed, pertinent positives per HPI otherwise noted to be negative. PHYSICAL EXAM   /74   Pulse 88   Temp 98.8 °F (37.1 °C) (Oral)   Resp 18   Ht 5' 2\" (1.575 m)   Wt (!) 350 lb (158.8 kg)   SpO2 97%   BMI 64.02 kg/m²   GENERAL APPEARANCE: Awake and alert. Cooperative. No acute distress  HEAD: Normocephalic. Atraumatic. No askew's sign.   Left posterior scalp, inferiorly with mild erythema, slight edema, small raised area, approximately half centimeter, no fluctuance, minimal induration, notable tenderness to palpation, no vesicular lesions  EYES: PERRL. EOM's grossly intact. No scleral icterus. No drainage. No periorbital ecchymosis. ENT: Mucous membranes are moist. Airway patent. No stridor. No epistaxis. No otorrhea or rhinorrhea. TMs without bulging/erythema/edema bilaterally, prior tympanostomy tube scarring  NECK: Supple. No rigidity, trachea midline, no adenopathy, normal range of motion, though she does report some discomfort in the area of the skin lesion  HEART: RRR. No murmurs/gallups/rubs  LUNGS: Respirations unlabored, Lungs are clear to ausculation bilaterally, no wheezes/crackles/rhonchi   EXTREMITIES: No peripheral edema. Moves all extremities equally. No obvious deformities. SKIN: Warm and dry. No acute rashes. NEUROLOGICAL: Alert and oriented x4. No gross facial drooping. Normal speech, steady gait  PSYCHIATRIC: Normal mood and affect. I am the primary clinician of record. ED COURSE/MDM  Patient seen and evaluated. Old records reviewed. Labs and imaging reviewed and results discussed with patient.     20 y.o. female with skin lesion, pain and swelling, it appears this started as a infected hair follicle, now with mild cellulitic changes surrounding, no large abscess requiring drainage, I did discuss watching for further lesions, would be less suspicious this is zoster, no vesicular lesions at this time, given primary care follow-up, initiated Bactrim/Keflex, given Tylenol for pain, given Zofran prescription in case nausea recurs at home, encouraged ibuprofen/Tylenol for pain at home, normal vitals, no current concern for sepsis/meningitis/encephalitis, strict return precautions given, all questions answered, will return if any worsening symptoms or new concerns, see AVS for further discharge information, patient verbalized understanding of plan, felt comfortable going home.      Orders Placed This Encounter   Procedures    Referral for No Primary Care Physician - Urgent     Orders Placed This Encounter   Medications    cephALEXin (KEFLEX) capsule 500 mg     Order Specific Question:   Antimicrobial Indications     Answer:   Skin and Soft Tissue Infection    sulfamethoxazole-trimethoprim (BACTRIM DS;SEPTRA DS) 800-160 MG per tablet 1 tablet     Order Specific Question:   Antimicrobial Indications     Answer:   Skin and Soft Tissue Infection    acetaminophen (TYLENOL) tablet 1,000 mg    ondansetron (ZOFRAN ODT) 4 MG disintegrating tablet     Sig: Take 1 tablet by mouth every 8 hours as needed for Nausea or Vomiting     Dispense:  10 tablet     Refill:  0    cephALEXin (KEFLEX) 500 MG capsule     Sig: Take 1 capsule by mouth 4 times daily for 7 days     Dispense:  28 capsule     Refill:  0    sulfamethoxazole-trimethoprim (BACTRIM DS) 800-160 MG per tablet     Sig: Take 1 tablet by mouth 2 times daily for 7 days     Dispense:  14 tablet     Refill:  0     ED Course as of 09/13/22 1226   u Sep 08, 2022   0243 Went to see the patient, she was not in room 4 [SY]      ED Course User Index  [SY] Shawnee Dumont DO           CLINICAL IMPRESSION  1. Cellulitis of scalp        Blood pressure 111/74, pulse 88, temperature 98.8 °F (37.1 °C), temperature source Oral, resp. rate 18, height 5' 2\" (1.575 m), weight (!) 350 lb (158.8 kg), SpO2 97 %. DISPOSITION  Elvia Batres was discharged to home in stable condition.                    Shawnee Dumont DO  09/13/22 1247

## 2022-12-29 ENCOUNTER — HOSPITAL ENCOUNTER (EMERGENCY)
Age: 22
Discharge: HOME | End: 2022-12-29
Payer: MEDICAID

## 2022-12-29 VITALS
SYSTOLIC BLOOD PRESSURE: 135 MMHG | HEIGHT: 62 IN | TEMPERATURE: 98.2 F | HEART RATE: 95 BPM | RESPIRATION RATE: 16 BRPM | WEIGHT: 293 LBS | OXYGEN SATURATION: 97 % | BODY MASS INDEX: 53.92 KG/M2 | DIASTOLIC BLOOD PRESSURE: 92 MMHG

## 2022-12-29 DIAGNOSIS — J02.9 ACUTE PHARYNGITIS, UNSPECIFIED ETIOLOGY: Primary | ICD-10-CM

## 2022-12-29 DIAGNOSIS — T14.8XXA SKIN FOREIGN BODY: ICD-10-CM

## 2022-12-29 LAB
INFLUENZA A: NOT DETECTED
INFLUENZA B: NOT DETECTED
S PYO AG THROAT QL: NEGATIVE
SARS-COV-2 RNA, RT PCR: NOT DETECTED

## 2022-12-29 PROCEDURE — 99283 EMERGENCY DEPT VISIT LOW MDM: CPT

## 2022-12-29 PROCEDURE — 6370000000 HC RX 637 (ALT 250 FOR IP): Performed by: NURSE PRACTITIONER

## 2022-12-29 PROCEDURE — 87636 SARSCOV2 & INF A&B AMP PRB: CPT

## 2022-12-29 PROCEDURE — 87880 STREP A ASSAY W/OPTIC: CPT

## 2022-12-29 PROCEDURE — 87081 CULTURE SCREEN ONLY: CPT

## 2022-12-29 RX ORDER — HYDROCODONE BITARTRATE AND ACETAMINOPHEN 5; 325 MG/1; MG/1
1 TABLET ORAL EVERY 6 HOURS PRN
Qty: 12 TABLET | Refills: 0 | Status: SHIPPED | OUTPATIENT
Start: 2022-12-29 | End: 2023-01-01

## 2022-12-29 RX ORDER — HYDROCODONE BITARTRATE AND ACETAMINOPHEN 5; 325 MG/1; MG/1
1 TABLET ORAL ONCE
Status: COMPLETED | OUTPATIENT
Start: 2022-12-29 | End: 2022-12-29

## 2022-12-29 RX ORDER — ACETAMINOPHEN 500 MG
500 TABLET ORAL EVERY 6 HOURS PRN
COMMUNITY

## 2022-12-29 RX ORDER — DOXYCYCLINE HYCLATE 100 MG
100 TABLET ORAL ONCE
Status: COMPLETED | OUTPATIENT
Start: 2022-12-29 | End: 2022-12-29

## 2022-12-29 RX ORDER — DOXYCYCLINE HYCLATE 100 MG
100 TABLET ORAL 2 TIMES DAILY
Qty: 14 TABLET | Refills: 0 | Status: SHIPPED | OUTPATIENT
Start: 2022-12-29 | End: 2023-01-05

## 2022-12-29 RX ADMIN — HYDROCODONE BITARTRATE AND ACETAMINOPHEN 1 TABLET: 5; 325 TABLET ORAL at 16:29

## 2022-12-29 RX ADMIN — DOXYCYCLINE HYCLATE 100 MG: 100 TABLET, COATED ORAL at 16:10

## 2022-12-29 ASSESSMENT — PAIN - FUNCTIONAL ASSESSMENT: PAIN_FUNCTIONAL_ASSESSMENT: 0-10

## 2022-12-29 ASSESSMENT — PAIN SCALES - GENERAL
PAINLEVEL_OUTOF10: 10
PAINLEVEL_OUTOF10: 10

## 2022-12-29 ASSESSMENT — PAIN DESCRIPTION - DESCRIPTORS: DESCRIPTORS: ACHING

## 2022-12-29 ASSESSMENT — PAIN DESCRIPTION - LOCATION
LOCATION: GENERALIZED
LOCATION: THROAT

## 2022-12-30 ENCOUNTER — OFFICE VISIT (OUTPATIENT)
Dept: ENT CLINIC | Age: 22
End: 2022-12-30

## 2022-12-30 VITALS — TEMPERATURE: 96.1 F | WEIGHT: 293 LBS | RESPIRATION RATE: 16 BRPM | HEIGHT: 62 IN | BODY MASS INDEX: 53.92 KG/M2

## 2022-12-30 DIAGNOSIS — L02.91 ABSCESS: ICD-10-CM

## 2022-12-30 DIAGNOSIS — Z78.9 BODY PIERCING: Primary | ICD-10-CM

## 2022-12-30 ASSESSMENT — ENCOUNTER SYMPTOMS
COUGH: 0
SORE THROAT: 0
FACIAL SWELLING: 1
EYE ITCHING: 0
SHORTNESS OF BREATH: 0
TROUBLE SWALLOWING: 0
APNEA: 0
SINUS PRESSURE: 0
VOICE CHANGE: 0

## 2022-12-30 NOTE — PROGRESS NOTES
[address], [address], [address] [place] [address]  P: [phone]       Patient     Gayathri Diaz  2000    ChiefComplaint     Chief Complaint   Patient presents with    New Patient     States that her lip piercing is infected, went to ED last night, states that part of the jewelry is still inside the wound tract. States that the piercing was placed on Monday. States that there was a very large amount of drainage yesterday. History of Present Illness     Anuradha Stevens is a 70-year-old female here today for evaluation of left upper lip infection. Had left upper lip piercing done on Monday swelling began Tuesday severe on Wednesday went to ED last night with spontaneous drainage of purulence out of piercing site with significant decompression.   Piercing remains in place but has dislodged within cheek and intraorally    Past Medical History     Past Medical History:   Diagnosis Date    Attention deficit disorder with hyperactivity(314.01)     JEANNE (generalized anxiety disorder)     GERD (gastroesophageal reflux disease)     Gonorrhea 10/18/2020    Hypothyroid     PTSD (post-traumatic stress disorder)     Severe recurrent major depression with psychotic features [facility])        Past Surgical History     Past Surgical History:   Procedure Laterality Date    MYRINGOTOMY Bilateral 10/26/2021    EXCISION  RIGHT EAR CANAL LESION, REMOVAL RIGHT EAR TUBE, LEFT DIAGNOSTIC MYRINGOTOMY  performed by Geraldine Noel MD at [facility] Right 10/26/2021    EXCISION  RIGHT EAR CANAL LESION, REMOVAL RIGHT EAR TUBE, LEFT DIAGNOSTIC MYRINGOTOMY  performed by Geraldine Noel MD at [facility]      x3 bilateral       Family History     Family History   Problem Relation Age of Onset    Diabetes Mother        Social History     Social History     Tobacco Use    Smoking status: Every Day     Packs/day: 1.00     Types: Cigarettes    Smokeless tobacco: Never   Vaping Use    Vaping Use: Some days    Substances: Nicotine   Substance Use Topics    Alcohol use: Yes     Comment: 1-2 drinks per month    Drug use: No        Allergies     Allergies   Allergen Reactions    Augmentin [Amoxicillin-Pot Clavulanate] Other (See Comments)     unknown       Medications     Current Outpatient Medications   Medication Sig Dispense Refill    doxycycline hyclate (VIBRA-TABS) 100 MG tablet Take 1 tablet by mouth 2 times daily for 7 days 14 tablet 0    acetaminophen (TYLENOL) 500 MG tablet Take 500 mg by mouth every 6 hours as needed for Pain (Patient not taking: Reported on 12/30/2022)      HYDROcodone-acetaminophen (NORCO) 5-325 MG per tablet Take 1 tablet by mouth every 6 hours as needed for Pain for up to 3 days. Intended supply: 3 days. Take lowest dose possible to manage pain Max Daily Amount: 4 tablets (Patient not taking: Reported on 12/30/2022) 12 tablet 0    ondansetron (ZOFRAN ODT) 4 MG disintegrating tablet Take 1 tablet by mouth every 8 hours as needed for Nausea or Vomiting (Patient not taking: No sig reported) 10 tablet 0     No current facility-administered medications for this visit. Review of Systems     Review of Systems   Constitutional:  Negative for appetite change, chills, fatigue, fever and unexpected weight change. HENT:  Positive for facial swelling. Negative for congestion, ear discharge, ear pain, hearing loss, nosebleeds, postnasal drip, sinus pressure, sneezing, sore throat, tinnitus, trouble swallowing and voice change. Eyes:  Negative for itching. Respiratory:  Negative for apnea, cough and shortness of breath. Endocrine: Negative for cold intolerance and heat intolerance. Musculoskeletal:  Negative for myalgias and neck pain. Skin:  Negative for rash. Allergic/Immunologic: Negative for environmental allergies. Neurological:  Negative for dizziness and headaches.    Psychiatric/Behavioral:  Negative for confusion, or erythema worsens. Elvira Rivera DO  12/30/22      Portions of this note were dictated using Dragon.  There may be linguistic errors secondary to the use of this program.

## 2022-12-31 LAB — S PYO THROAT QL CULT: NORMAL

## 2022-12-31 NOTE — ED PROVIDER NOTES
[address] [place] [address]  ED  EMERGENCY DEPARTMENT ENCOUNTER      This patient was not seen and evaluated by the attending physician. Pt Name: Leonel Garduno  MRN: [id number]  [place] 2000  Date of evaluation: 12/29/2022  Provider: LAYLA Cueva - CNP-C  PCP: Xi Rdz DO      History provided by the patient. CHIEFCOMPLAINT:     Chief Complaint   Patient presents with    Pharyngitis     Began having sore throat on Monday and fever and increased pain began Wednesday. Also has infected lip ring on upper lip that the skin has grown over. HISTORY OF PRESENT ILLNESS:      Leonel Garduno is a 20 y.o. female who presents to [address] [place] [address]  ED with complaints of sore throat and body aches and fever. Patient states that she has had increased sore throat since Wednesday, she also noted that she has a lip ring that has retracted totally into her lip and is infected, patient with multiple facial piercings. Complaining of generalized 10 out of 10 pain. She is here for further evaluation. Pamela Flanagan  SEVERITY:10  DURATION:today  MODIFYING FACTORS:none noted    Nursing Notes were reviewed     REVIEW OF SYSTEMS:     Review of Systems  All systems, a total of 10, are reviewed and negative except for those that were just noted in history present illness.         PAST MEDICAL HISTORY:     Past Medical History:   Diagnosis Date    Attention deficit disorder with hyperactivity(314.01)     JEANNE (generalized anxiety disorder)     GERD (gastroesophageal reflux disease)     Gonorrhea 10/18/2020    Hypothyroid     PTSD (post-traumatic stress disorder)     Severe recurrent major depression with psychotic features [facility])          SURGICAL HISTORY:      Past Surgical History:   Procedure Laterality Date    MYRINGOTOMY Bilateral 10/26/2021    EXCISION  RIGHT EAR CANAL LESION, REMOVAL RIGHT EAR TUBE, LEFT DIAGNOSTIC MYRINGOTOMY  performed by Bessie Siu MD at [facility] Right 10/26/2021    EXCISION  RIGHT EAR CANAL LESION, REMOVAL RIGHT EAR TUBE, LEFT DIAGNOSTIC MYRINGOTOMY  performed by Trell Norman MD at [facility]      x3 bilateral         CURRENT MEDICATIONS:       Discharge Medication List as of 12/29/2022  4:48 PM        CONTINUE these medications which have NOT CHANGED    Details   acetaminophen (TYLENOL) 500 MG tablet Take 500 mg by mouth every 6 hours as needed for PainHistorical Med      ondansetron (ZOFRAN ODT) 4 MG disintegrating tablet Take 1 tablet by mouth every 8 hours as needed for Nausea or Vomiting, Disp-10 tablet, R-0Normal               ALLERGIES:    Augmentin [amoxicillin-pot clavulanate]    FAMILY HISTORY:       Family History   Problem Relation Age of Onset    Diabetes Mother           SOCIAL HISTORY:     Social History     Socioeconomic History    Marital status: Single     Spouse name: None    Number of children: None    Years of education: None    Highest education level: None   Occupational History    Occupation: [employer]   Tobacco Use    Smoking status: Every Day     Packs/day: 1.00     Types: Cigarettes    Smokeless tobacco: Never   Vaping Use    Vaping Use: Some days    Substances: Nicotine   Substance and Sexual Activity    Alcohol use: Yes     Comment: 1-2 drinks per month    Drug use: No    Sexual activity: Yes     Partners: Male       SCREENINGS:    [place] Coma Scale  Eye Opening: Spontaneous  Best Verbal Response: Oriented  Best Motor Response: Obeys commands  Tomas Coma Scale Score: 15        PHYSICAL EXAM:       ED Triage Vitals [12/29/22 1255]   BP Temp Temp Source Heart Rate Resp SpO2 Height Weight   (!) 154/105 99.1 °F (37.3 °C) Oral 95 20 98 % 5' 2\" (1.575 m) (!) 320 lb (145.2 kg)       Physical Exam    CONSTITUTIONAL: Awake and alert. Cooperative. Well-developed. Well-nourished.    Vitals:    12/29/22 1255 12/29/22 1546 12/29/22 1636   BP: (!) 154/105 (!) 145/72 (!) 135/92   Pulse: 95 86 95   Resp: 20 19 16   Temp: 99.1 °F (37.3 °C)  98.2 °F (36.8 °C)   TempSrc: Oral  Axillary   SpO2: 98% 96% 97%   Weight: (!) 320 lb (145.2 kg)     Height: 5' 2\" (1.575 m)       HENT: Normocephalic. Atraumatic. External ears normal, without discharge. TMs clear bilaterally. Nonasal discharge. Oropharynx is erythematous with no exudate, uvula midline. Patient upper lip is significantly swollen on the left where she has a lip ring that has retracted into the upper lip completely. No drainage, very tender. Mucous membranes moist.  EYES: Conjunctiva non-injected, nolid abnormalities noted. No scleral icterus. PERRL. EOM's grossly intact. Anterior chambers clear. NECK: Supple. Normal ROM. No meningismus. No thyroid tenderness or swelling noted. CARDIOVASCULAR: RRR. No Murmer. No carotid bruits. PULMONARY/CHEST WALL: Effort normal. No tachypnea. Lungs clear to ausculation. ABDOMEN: Normal BS. Soft. Nondistended. No tenderness to palpation. No guarding. No hernias noted. No splenomegaly. Back: Spine is midline. No ecchymosis. No crepituson palpation. No obvious subluxation of vertebral column. No saddle anesthesia or evidence of cauda equina. /ANORECTAL: Not assessed  MUSKULOSKELETAL: Normal ROM. No acute deformities. No edema. No tenderness to palpate. SKIN: Warm and dry. NEUROLOGICAL:  GCS 15. CN II-XII grossly intact. Strength is 5/5 in all extremities and sensation is intact. PSYCHIATRIC: Normal affect, normal insight and judgement. Alert and oriented x 3.         DIAGNOSTIC RESULTS:     LABS:    Results for orders placed or performed during the hospital encounter of 12/29/22   COVID-19 & Influenza Combo    Specimen: Nasopharyngeal Swab   Result Value Ref Range    SARS-CoV-2 RNA, RT PCR NOT DETECTED NOT DETECTED    INFLUENZA A NOT DETECTED NOT DETECTED    INFLUENZA B NOT DETECTED NOT DETECTED   Strep screen group a throat    Specimen: Throat   Result Value Ref Range Rapid Strep A Screen Negative Negative   Culture, Beta Strep Confirm Plates    Specimen: Throat   Result Value Ref Range    Strep A Culture Normal oral kyle, No Beta Strep isolated          RADIOLOGY:  All x-ray studies are viewed/reviewed by me. Formal interpretations per the radiologist are as follows: No orders to display           EKG:  See EKG interpretation by an attending physician. PROCEDURES:   N/A    CRITICAL CARE TIME:   N/A    CONSULTS:  IP CONSULT TO OTOLARYNGOLOGY      EMERGENCY DEPARTMENT COURSE andDIFFERENTIAL DIAGNOSIS/MDM:   Vitals:    Vitals:    12/29/22 1255 12/29/22 1546 12/29/22 1636   BP: (!) 154/105 (!) 145/72 (!) 135/92   Pulse: 95 86 95   Resp: 20 19 16   Temp: 99.1 °F (37.3 °C)  98.2 °F (36.8 °C)   TempSrc: Oral  Axillary   SpO2: 98% 96% 97%   Weight: (!) 320 lb (145.2 kg)     Height: 5' 2\" (1.575 m)         Patient wasgiven the following medications:  Medications   doxycycline hyclate (VIBRA-TABS) tablet 100 mg (100 mg Oral Given 12/29/22 1610)   HYDROcodone-acetaminophen (NORCO) 5-325 MG per tablet 1 tablet (1 tablet Oral Given 12/29/22 1629)         Patient was evaluated independently by myself with the attending physician available for consultation. Patient presented to the emerged from today with complaints of sore throat and body aches. Her viral work-up was negative, she did have a lip piercing that is retracted into her lip on the left upper lip, I am unable to see it to remove it, does appear to be acutely infected patient started antibiotics I did consult ENT, they can evaluate the patient in the office tomorrow. This was communicated to the patient, she was agreeable with this plan. She was discharged home in good condition with follow-up instructions. Patient laboratory studies, radiographic imaging, and assessment were all discussed with the patient and/orpatient family.   There was shared decision-making between myself as well as the patient and/or their surrogate and we are all in agreement with discharge home. There was an opportunity for questions and all questions were answered tothe best of my ability and to the satisfaction of the patient and/or patient family. FINAL IMPRESSION:      1. Acute pharyngitis, unspecified etiology    2. Skin foreign body          DISPOSITION/PLAN:   DISPOSITION Decision To Discharge      PATIENT REFERRED TO:  Romelia Wright MD  [address]  [address] [facility] [address]  [phone]    Call   For follow up    [facility] [address]  [phone]  Go to   If symptoms worsen      DISCHARGE MEDICATIONS:  Discharge Medication List as of 12/29/2022  4:48 PM        START taking these medications    Details   HYDROcodone-acetaminophen (NORCO) 5-325 MG per tablet Take 1 tablet by mouth every 6 hours as needed for Pain for up to 3 days. Intended supply: 3 days.  Take lowest dose possible to manage pain Max Daily Amount: 4 tablets, Disp-12 tablet, R-0Print      doxycycline hyclate (VIBRA-TABS) 100 MG tablet Take 1 tablet by mouth 2 times daily for 7 days, Disp-14 tablet, R-0Print                        (Please note thatportions of this note were completed with a voice recognition program.  Efforts were made to edit the dictations, but occasionally words are mis-transcribed.)    LAYLA Martinez CNP-C (electronicallysigned)         LAYLA Martinez CNP  12/31/22 8899

## 2024-06-05 ENCOUNTER — HOSPITAL ENCOUNTER (EMERGENCY)
Age: 24
Discharge: HOME | End: 2024-06-05
Payer: MEDICAID

## 2024-06-05 VITALS
OXYGEN SATURATION: 98 % | RESPIRATION RATE: 15 BRPM | HEART RATE: 65 BPM | DIASTOLIC BLOOD PRESSURE: 91 MMHG | TEMPERATURE: 97.7 F | SYSTOLIC BLOOD PRESSURE: 136 MMHG

## 2024-06-05 DIAGNOSIS — K04.7 DENTAL INFECTION: ICD-10-CM

## 2024-06-05 DIAGNOSIS — K02.9 PAIN DUE TO DENTAL CARIES: Primary | ICD-10-CM

## 2024-06-05 PROCEDURE — 6360000002 HC RX W HCPCS: Performed by: PHYSICIAN ASSISTANT

## 2024-06-05 PROCEDURE — 96372 THER/PROPH/DIAG INJ SC/IM: CPT

## 2024-06-05 PROCEDURE — 99284 EMERGENCY DEPT VISIT MOD MDM: CPT

## 2024-06-05 RX ORDER — AMOXICILLIN 500 MG/1
500 CAPSULE ORAL 3 TIMES DAILY
Qty: 21 CAPSULE | Refills: 0 | Status: SHIPPED | OUTPATIENT
Start: 2024-06-05 | End: 2024-06-12

## 2024-06-05 RX ORDER — KETOROLAC TROMETHAMINE 15 MG/ML
30 INJECTION, SOLUTION INTRAMUSCULAR; INTRAVENOUS ONCE
Status: COMPLETED | OUTPATIENT
Start: 2024-06-05 | End: 2024-06-05

## 2024-06-05 RX ORDER — IBUPROFEN 600 MG/1
600 TABLET ORAL 3 TIMES DAILY PRN
Qty: 30 TABLET | Refills: 0 | Status: SHIPPED | OUTPATIENT
Start: 2024-06-05

## 2024-06-05 RX ADMIN — KETOROLAC TROMETHAMINE 30 MG: 15 INJECTION, SOLUTION INTRAMUSCULAR; INTRAVENOUS at 16:44

## 2024-06-05 ASSESSMENT — LIFESTYLE VARIABLES
HOW OFTEN DO YOU HAVE A DRINK CONTAINING ALCOHOL: NEVER
HOW MANY STANDARD DRINKS CONTAINING ALCOHOL DO YOU HAVE ON A TYPICAL DAY: PATIENT DOES NOT DRINK

## 2024-06-05 ASSESSMENT — PAIN - FUNCTIONAL ASSESSMENT: PAIN_FUNCTIONAL_ASSESSMENT: 0-10

## 2024-06-05 ASSESSMENT — PAIN SCALES - GENERAL: PAINLEVEL_OUTOF10: 4

## 2024-06-05 NOTE — ED PROVIDER NOTES
[facility] ED  EMERGENCY DEPARTMENT ENCOUNTER        Pt Name: Dedra Tabor  MRN: [id number]  Birthdate 2000  Date of evaluation: 6/5/2024  Provider: Natalia Ariaz PA-C  PCP: No primary care provider on file.  Note Started: 4:33 PM EDT 6/5/24      ERNIE. I have evaluated this patient.        CHIEF COMPLAINT       Chief Complaint   Patient presents with    Dental Pain     L sided dental pain starting months ago but got severe over past 3 days. Can't sleep, \"eating tylenol like candy\"       HISTORY OF PRESENT ILLNESS: 1 or more Elements     History from : Patient    Limitations to history : None    Dedra Tabor is a 23 y.o. female who presents left back lower molar pain for 3 days. She states she hasn't been able to get into a dentist. Pt denies fever. No hx of heart murmur. Pt is asking for referral to oral surgery or dentist.     Nursing Notes were all reviewed and agreed with or any disagreements were addressed in the HPI.    REVIEW OF SYSTEMS :      Review of Systems  Pt denies cp, sob, abd pain, v/d. No st or ear pain. No rash.   Positives and Pertinent negatives as per HPI.     SURGICAL HISTORY     Past Surgical History:   Procedure Laterality Date    MYRINGOTOMY Bilateral 10/26/2021    EXCISION  RIGHT EAR CANAL LESION, REMOVAL RIGHT EAR TUBE, LEFT DIAGNOSTIC MYRINGOTOMY  performed by Radhames Richey MD at [facility] OR    SKIN BIOPSY Right 10/26/2021    EXCISION  RIGHT EAR CANAL LESION, REMOVAL RIGHT EAR TUBE, LEFT DIAGNOSTIC MYRINGOTOMY  performed by Radhames Richey MD at [facility] OR    TONSILLECTOMY      TYMPANOSTOMY TUBE PLACEMENT      x3 bilateral       CURRENTMEDICATIONS       Discharge Medication List as of 6/5/2024  4:50 PM        CONTINUE these medications which have NOT CHANGED    Details   acetaminophen (TYLENOL) 500 MG tablet Take 500 mg by mouth every 6 hours as needed for PainHistorical Med      ondansetron (ZOFRAN ODT) 4 MG disintegrating tablet Take 1 tablet

## 2024-11-18 ENCOUNTER — APPOINTMENT (OUTPATIENT)
Dept: CT IMAGING | Age: 24
End: 2024-11-18
Payer: MEDICAID

## 2024-11-18 ENCOUNTER — APPOINTMENT (OUTPATIENT)
Dept: GENERAL RADIOLOGY | Age: 24
End: 2024-11-18
Payer: MEDICAID

## 2024-11-18 ENCOUNTER — HOSPITAL ENCOUNTER (EMERGENCY)
Age: 24
Discharge: HOME | End: 2024-11-18
Attending: STUDENT IN AN ORGANIZED HEALTH CARE EDUCATION/TRAINING PROGRAM
Payer: MEDICAID

## 2024-11-18 VITALS
HEIGHT: 62 IN | RESPIRATION RATE: 18 BRPM | HEART RATE: 51 BPM | WEIGHT: 292.4 LBS | OXYGEN SATURATION: 95 % | BODY MASS INDEX: 53.81 KG/M2 | DIASTOLIC BLOOD PRESSURE: 65 MMHG | TEMPERATURE: 98.7 F | SYSTOLIC BLOOD PRESSURE: 114 MMHG

## 2024-11-18 DIAGNOSIS — N63.0 BREAST NODULE: ICD-10-CM

## 2024-11-18 DIAGNOSIS — R05.1 ACUTE COUGH: ICD-10-CM

## 2024-11-18 DIAGNOSIS — R07.9 CHEST PAIN, UNSPECIFIED TYPE: ICD-10-CM

## 2024-11-18 DIAGNOSIS — J18.9 COMMUNITY ACQUIRED PNEUMONIA OF RIGHT LOWER LOBE OF LUNG: Primary | ICD-10-CM

## 2024-11-18 LAB
ALBUMIN SERPL-MCNC: 4.1 G/DL (ref 3.4–5)
ALBUMIN/GLOB SERPL: 1.1 {RATIO} (ref 1.1–2.2)
ALP SERPL-CCNC: 61 U/L (ref 40–129)
ALT SERPL-CCNC: 11 U/L (ref 10–40)
ANION GAP SERPL CALCULATED.3IONS-SCNC: 14 MMOL/L (ref 3–16)
AST SERPL-CCNC: 11 U/L (ref 15–37)
BACTERIA URNS QL MICRO: ABNORMAL /HPF
BASOPHILS # BLD: 0 K/UL (ref 0–0.2)
BASOPHILS NFR BLD: 0.7 %
BILIRUB SERPL-MCNC: 0.3 MG/DL (ref 0–1)
BILIRUB UR QL STRIP.AUTO: NEGATIVE
BUN SERPL-MCNC: 12 MG/DL (ref 7–20)
CALCIUM SERPL-MCNC: 9.3 MG/DL (ref 8.3–10.6)
CHLORIDE SERPL-SCNC: 100 MMOL/L (ref 99–110)
CLARITY UR: ABNORMAL
CO2 SERPL-SCNC: 25 MMOL/L (ref 21–32)
COLOR UR: YELLOW
CREAT SERPL-MCNC: 0.6 MG/DL (ref 0.6–1.1)
DEPRECATED RDW RBC AUTO: 13.9 % (ref 12.4–15.4)
EKG ATRIAL RATE: 79 BPM
EKG DIAGNOSIS: NORMAL
EKG P AXIS: 35 DEGREES
EKG P-R INTERVAL: 160 MS
EKG Q-T INTERVAL: 368 MS
EKG QRS DURATION: 98 MS
EKG QTC CALCULATION (BAZETT): 421 MS
EKG R AXIS: -13 DEGREES
EKG T AXIS: 20 DEGREES
EKG VENTRICULAR RATE: 79 BPM
EOSINOPHIL # BLD: 0 K/UL (ref 0–0.6)
EOSINOPHIL NFR BLD: 0.3 %
EPI CELLS #/AREA URNS HPF: ABNORMAL /HPF (ref 0–5)
GFR SERPLBLD CREATININE-BSD FMLA CKD-EPI: >90 ML/MIN/{1.73_M2}
GLUCOSE SERPL-MCNC: 103 MG/DL (ref 70–99)
GLUCOSE UR STRIP.AUTO-MCNC: NEGATIVE MG/DL
HCG SERPL QL: NEGATIVE
HCT VFR BLD AUTO: 38 % (ref 36–48)
HGB BLD-MCNC: 12.1 G/DL (ref 12–16)
HGB UR QL STRIP.AUTO: NEGATIVE
KETONES UR STRIP.AUTO-MCNC: NEGATIVE MG/DL
LEUKOCYTE ESTERASE UR QL STRIP.AUTO: NEGATIVE
LIPASE SERPL-CCNC: 17 U/L (ref 13–60)
LYMPHOCYTES # BLD: 1.7 K/UL (ref 1–5.1)
LYMPHOCYTES NFR BLD: 24.6 %
MCH RBC QN AUTO: 25.2 PG (ref 26–34)
MCHC RBC AUTO-ENTMCNC: 32 G/DL (ref 31–36)
MCV RBC AUTO: 78.7 FL (ref 80–100)
MONOCYTES # BLD: 0.5 K/UL (ref 0–1.3)
MONOCYTES NFR BLD: 7.5 %
MUCOUS THREADS #/AREA URNS LPF: ABNORMAL /LPF
NEUTROPHILS # BLD: 4.7 K/UL (ref 1.7–7.7)
NEUTROPHILS NFR BLD: 66.9 %
NITRITE UR QL STRIP.AUTO: NEGATIVE
PH UR STRIP.AUTO: 5.5 [PH] (ref 5–8)
PLATELET # BLD AUTO: 357 K/UL (ref 135–450)
PMV BLD AUTO: 7.9 FL (ref 5–10.5)
POTASSIUM SERPL-SCNC: 3.9 MMOL/L (ref 3.5–5.1)
PROT SERPL-MCNC: 7.8 G/DL (ref 6.4–8.2)
PROT UR STRIP.AUTO-MCNC: ABNORMAL MG/DL
RBC # BLD AUTO: 4.83 M/UL (ref 4–5.2)
RBC #/AREA URNS HPF: ABNORMAL /HPF (ref 0–4)
SODIUM SERPL-SCNC: 139 MMOL/L (ref 136–145)
SP GR UR STRIP.AUTO: >=1.03 (ref 1–1.03)
TROPONIN, HIGH SENSITIVITY: <6 NG/L (ref 0–14)
UA COMPLETE W REFLEX CULTURE PNL UR: ABNORMAL
UA DIPSTICK W REFLEX MICRO PNL UR: YES
URN SPEC COLLECT METH UR: ABNORMAL
UROBILINOGEN UR STRIP-ACNC: 0.2 E.U./DL
WBC # BLD AUTO: 7 K/UL (ref 4–11)
WBC #/AREA URNS HPF: ABNORMAL /HPF (ref 0–5)

## 2024-11-18 PROCEDURE — 36415 COLL VENOUS BLD VENIPUNCTURE: CPT

## 2024-11-18 PROCEDURE — 84703 CHORIONIC GONADOTROPIN ASSAY: CPT

## 2024-11-18 PROCEDURE — 93010 ELECTROCARDIOGRAM REPORT: CPT | Performed by: INTERNAL MEDICINE

## 2024-11-18 PROCEDURE — 74177 CT ABD & PELVIS W/CONTRAST: CPT

## 2024-11-18 PROCEDURE — 2580000003 HC RX 258: Performed by: STUDENT IN AN ORGANIZED HEALTH CARE EDUCATION/TRAINING PROGRAM

## 2024-11-18 PROCEDURE — 83690 ASSAY OF LIPASE: CPT

## 2024-11-18 PROCEDURE — 6360000004 HC RX CONTRAST MEDICATION: Performed by: STUDENT IN AN ORGANIZED HEALTH CARE EDUCATION/TRAINING PROGRAM

## 2024-11-18 PROCEDURE — 81001 URINALYSIS AUTO W/SCOPE: CPT

## 2024-11-18 PROCEDURE — 6370000000 HC RX 637 (ALT 250 FOR IP): Performed by: STUDENT IN AN ORGANIZED HEALTH CARE EDUCATION/TRAINING PROGRAM

## 2024-11-18 PROCEDURE — 96374 THER/PROPH/DIAG INJ IV PUSH: CPT

## 2024-11-18 PROCEDURE — 71045 X-RAY EXAM CHEST 1 VIEW: CPT

## 2024-11-18 PROCEDURE — 80053 COMPREHEN METABOLIC PANEL: CPT

## 2024-11-18 PROCEDURE — 99285 EMERGENCY DEPT VISIT HI MDM: CPT

## 2024-11-18 PROCEDURE — 93005 ELECTROCARDIOGRAM TRACING: CPT | Performed by: STUDENT IN AN ORGANIZED HEALTH CARE EDUCATION/TRAINING PROGRAM

## 2024-11-18 PROCEDURE — 96375 TX/PRO/DX INJ NEW DRUG ADDON: CPT

## 2024-11-18 PROCEDURE — 84484 ASSAY OF TROPONIN QUANT: CPT

## 2024-11-18 PROCEDURE — 85025 COMPLETE CBC W/AUTO DIFF WBC: CPT

## 2024-11-18 PROCEDURE — 6360000002 HC RX W HCPCS: Performed by: STUDENT IN AN ORGANIZED HEALTH CARE EDUCATION/TRAINING PROGRAM

## 2024-11-18 RX ORDER — LEVOFLOXACIN 750 MG/1
750 TABLET, FILM COATED ORAL DAILY
Qty: 7 TABLET | Refills: 0 | Status: SHIPPED | OUTPATIENT
Start: 2024-11-18 | End: 2024-11-25

## 2024-11-18 RX ORDER — OXYCODONE AND ACETAMINOPHEN 5; 325 MG/1; MG/1
1 TABLET ORAL ONCE
Status: COMPLETED | OUTPATIENT
Start: 2024-11-18 | End: 2024-11-18

## 2024-11-18 RX ORDER — ONDANSETRON 4 MG/1
4 TABLET, ORALLY DISINTEGRATING ORAL 3 TIMES DAILY PRN
Qty: 21 TABLET | Refills: 0 | Status: SHIPPED | OUTPATIENT
Start: 2024-11-18

## 2024-11-18 RX ORDER — 0.9 % SODIUM CHLORIDE 0.9 %
1000 INTRAVENOUS SOLUTION INTRAVENOUS ONCE
Status: COMPLETED | OUTPATIENT
Start: 2024-11-18 | End: 2024-11-18

## 2024-11-18 RX ORDER — LEVOFLOXACIN 500 MG/1
500 TABLET, FILM COATED ORAL ONCE
Status: COMPLETED | OUTPATIENT
Start: 2024-11-18 | End: 2024-11-18

## 2024-11-18 RX ORDER — ONDANSETRON 4 MG/1
4 TABLET, ORALLY DISINTEGRATING ORAL ONCE
Status: COMPLETED | OUTPATIENT
Start: 2024-11-18 | End: 2024-11-18

## 2024-11-18 RX ORDER — ONDANSETRON 2 MG/ML
4 INJECTION INTRAMUSCULAR; INTRAVENOUS ONCE
Status: COMPLETED | OUTPATIENT
Start: 2024-11-18 | End: 2024-11-18

## 2024-11-18 RX ORDER — METHOCARBAMOL 750 MG/1
750 TABLET, FILM COATED ORAL 4 TIMES DAILY
Qty: 40 TABLET | Refills: 0 | Status: SHIPPED | OUTPATIENT
Start: 2024-11-18 | End: 2024-11-28

## 2024-11-18 RX ORDER — KETOROLAC TROMETHAMINE 15 MG/ML
15 INJECTION, SOLUTION INTRAMUSCULAR; INTRAVENOUS ONCE
Status: COMPLETED | OUTPATIENT
Start: 2024-11-18 | End: 2024-11-18

## 2024-11-18 RX ORDER — IOPAMIDOL 755 MG/ML
75 INJECTION, SOLUTION INTRAVASCULAR
Status: COMPLETED | OUTPATIENT
Start: 2024-11-18 | End: 2024-11-18

## 2024-11-18 RX ORDER — MORPHINE SULFATE 2 MG/ML
2 INJECTION, SOLUTION INTRAMUSCULAR; INTRAVENOUS ONCE
Status: COMPLETED | OUTPATIENT
Start: 2024-11-18 | End: 2024-11-18

## 2024-11-18 RX ADMIN — MORPHINE SULFATE 2 MG: 2 INJECTION, SOLUTION INTRAMUSCULAR; INTRAVENOUS at 09:59

## 2024-11-18 RX ADMIN — IOPAMIDOL 75 ML: 755 INJECTION, SOLUTION INTRAVENOUS at 11:05

## 2024-11-18 RX ADMIN — SODIUM CHLORIDE 1000 ML: 9 INJECTION, SOLUTION INTRAVENOUS at 10:02

## 2024-11-18 RX ADMIN — LEVOFLOXACIN 500 MG: 500 TABLET, FILM COATED ORAL at 12:35

## 2024-11-18 RX ADMIN — KETOROLAC TROMETHAMINE 15 MG: 15 INJECTION, SOLUTION INTRAMUSCULAR; INTRAVENOUS at 09:59

## 2024-11-18 RX ADMIN — OXYCODONE HYDROCHLORIDE AND ACETAMINOPHEN 1 TABLET: 5; 325 TABLET ORAL at 12:35

## 2024-11-18 RX ADMIN — ONDANSETRON 4 MG: 4 TABLET, ORALLY DISINTEGRATING ORAL at 12:35

## 2024-11-18 RX ADMIN — ONDANSETRON 4 MG: 2 INJECTION INTRAMUSCULAR; INTRAVENOUS at 09:59

## 2024-11-18 ASSESSMENT — ENCOUNTER SYMPTOMS
SHORTNESS OF BREATH: 1
ABDOMINAL PAIN: 1
COUGH: 1

## 2024-11-18 ASSESSMENT — PAIN - FUNCTIONAL ASSESSMENT: PAIN_FUNCTIONAL_ASSESSMENT: 0-10

## 2024-11-18 ASSESSMENT — PAIN SCALES - GENERAL
PAINLEVEL_OUTOF10: 10
PAINLEVEL_OUTOF10: 7
PAINLEVEL_OUTOF10: 5

## 2024-11-18 NOTE — ED NOTES
Pt discharge instructions, follow up and rx x 4 reviewed with pt. Pt verbalized understanding. No further needs. Pt discharged at this time.

## 2024-11-18 NOTE — ED PROVIDER NOTES
using Dragon Dictation system and may contain errors related to that system including errors in grammar, punctuation, and spelling, as well as words and phrases that may be inappropriate. If there are any questions or concerns please feel free to contact the dictating provider for clarification.     Mikki Hyatt MD   [facility]        Mikki Hyatt MD  11/18/24 8224

## (undated) DEVICE — GLOVE ORANGE PI 7 1/2   MSG9075

## (undated) DEVICE — BLADE ES ELASTOMERIC COAT INSUL DURABLE BEND UPTO 90DEG

## (undated) DEVICE — COVER,MAYO STAND,STERILE: Brand: MEDLINE

## (undated) DEVICE — SPONGE GZ W4XL4IN COT RADPQ HIGHLY ABSRB

## (undated) DEVICE — MERCY HEALTH WEST TURNOVER: Brand: MEDLINE INDUSTRIES, INC.

## (undated) DEVICE — SOLUTION SCRB 4OZ 10% POVIDONE IOD ANTIMIC BTL

## (undated) DEVICE — 1010 S-DRAPE TOWEL DRAPE 10/BX: Brand: STERI-DRAPE™

## (undated) DEVICE — MICRO TIP WIPE: Brand: DEVON

## (undated) DEVICE — MICRO KOVER: Brand: UNBRANDED

## (undated) DEVICE — TOWEL,OR,DSP,ST,BLUE,STD,4/PK,20PK/CS: Brand: MEDLINE

## (undated) DEVICE — KIT DRESSING EAR ADULT ST

## (undated) DEVICE — SOLUTION PREP POVIDONE IOD FOR SKIN MUCOUS MEM PRIOR TO

## (undated) DEVICE — SUTURE PLN GUT SZ 5-0 L18IN ABSRB YELLOWISH TAN P-3 L13MM 686G

## (undated) DEVICE — SYRINGE MED 10ML LUERLOCK TIP W/O SFTY DISP

## (undated) DEVICE — ELECTRODE PT RET AD L9FT HI MOIST COND ADH HYDRGEL CORDED

## (undated) DEVICE — STAPLER SKIN H3.9MM WIRE DIA0.58MM CRWN 6.9MM 35 STPL ROT

## (undated) DEVICE — MASTISOL ADHESIVE LIQ 2/3ML

## (undated) DEVICE — COTTON BALLS: Brand: DEROYAL

## (undated) DEVICE — COVER LT HNDL BLU PLAS

## (undated) DEVICE — COTTON BALLS 5/PK: Brand: CARDINAL HEALTH

## (undated) DEVICE — SYRINGE MED 3ML CLR PLAS STD N CTRL LUERLOCK TIP DISP

## (undated) DEVICE — DRAIN SURG 0.25X18 IN STRL PENROSE

## (undated) DEVICE — SOLUTION IV IRRIG 500ML 0.9% SODIUM CHL 2F7123

## (undated) DEVICE — SURGIFOAM SPNG SZ 100

## (undated) DEVICE — Z DISCONTINUED BY MEDLINE USE 2711682 TRAY SKIN PREP DRY W/ PREM GLV

## (undated) DEVICE — STRIP,CLOSURE,WOUND,MEDI-STRIP,1/2X4: Brand: MEDLINE

## (undated) DEVICE — PENCIL ES L3M BTTN SWCH S STL HEX LOK BLDE ELECTRD HOLSTER

## (undated) DEVICE — ENT I-LF: Brand: MEDLINE INDUSTRIES, INC.

## (undated) DEVICE — SUTURE VCRL SZ 3-0 L18IN ABSRB UD L26MM SH 1/2 CIR J864D

## (undated) DEVICE — TUBING, SUCTION, 1/4" X 12', STRAIGHT: Brand: MEDLINE

## (undated) DEVICE — OPHTHALMIC CORNEAL/SCLERAL V-LANCE KNIFE 20 GAUGE [1.3MM]: Brand: V-LANCE